# Patient Record
Sex: MALE | Race: WHITE | NOT HISPANIC OR LATINO | URBAN - METROPOLITAN AREA
[De-identification: names, ages, dates, MRNs, and addresses within clinical notes are randomized per-mention and may not be internally consistent; named-entity substitution may affect disease eponyms.]

---

## 2020-05-26 ENCOUNTER — INPATIENT (INPATIENT)
Facility: HOSPITAL | Age: 85
LOS: 2 days | Discharge: HOME HEALTH | End: 2020-05-29
Attending: HOSPITALIST | Admitting: HOSPITALIST
Payer: MEDICARE

## 2020-05-26 VITALS
OXYGEN SATURATION: 99 % | RESPIRATION RATE: 16 BRPM | SYSTOLIC BLOOD PRESSURE: 214 MMHG | TEMPERATURE: 97 F | DIASTOLIC BLOOD PRESSURE: 92 MMHG | HEART RATE: 76 BPM

## 2020-05-26 LAB
ALBUMIN SERPL ELPH-MCNC: 3.9 G/DL — SIGNIFICANT CHANGE UP (ref 3.5–5.2)
ALP SERPL-CCNC: 232 U/L — HIGH (ref 30–115)
ALT FLD-CCNC: 11 U/L — SIGNIFICANT CHANGE UP (ref 0–41)
ANION GAP SERPL CALC-SCNC: 10 MMOL/L — SIGNIFICANT CHANGE UP (ref 7–14)
APPEARANCE UR: CLEAR — SIGNIFICANT CHANGE UP
APTT BLD: 28.5 SEC — SIGNIFICANT CHANGE UP (ref 27–39.2)
AST SERPL-CCNC: 15 U/L — SIGNIFICANT CHANGE UP (ref 0–41)
BACTERIA # UR AUTO: ABNORMAL
BASOPHILS # BLD AUTO: 0.03 K/UL — SIGNIFICANT CHANGE UP (ref 0–0.2)
BASOPHILS NFR BLD AUTO: 0.4 % — SIGNIFICANT CHANGE UP (ref 0–1)
BILIRUB SERPL-MCNC: 0.3 MG/DL — SIGNIFICANT CHANGE UP (ref 0.2–1.2)
BILIRUB UR-MCNC: NEGATIVE — SIGNIFICANT CHANGE UP
BUN SERPL-MCNC: 59 MG/DL — HIGH (ref 10–20)
CALCIUM SERPL-MCNC: 8.7 MG/DL — SIGNIFICANT CHANGE UP (ref 8.5–10.1)
CHLORIDE SERPL-SCNC: 108 MMOL/L — SIGNIFICANT CHANGE UP (ref 98–110)
CO2 SERPL-SCNC: 22 MMOL/L — SIGNIFICANT CHANGE UP (ref 17–32)
COD CRY URNS QL: NEGATIVE — SIGNIFICANT CHANGE UP
COLOR SPEC: YELLOW — SIGNIFICANT CHANGE UP
CREAT SERPL-MCNC: 2.7 MG/DL — HIGH (ref 0.7–1.5)
DIFF PNL FLD: ABNORMAL
EOSINOPHIL # BLD AUTO: 0.18 K/UL — SIGNIFICANT CHANGE UP (ref 0–0.7)
EOSINOPHIL NFR BLD AUTO: 2.5 % — SIGNIFICANT CHANGE UP (ref 0–8)
EPI CELLS # UR: ABNORMAL /HPF
ETHANOL SERPL-MCNC: <10 MG/DL — SIGNIFICANT CHANGE UP
GLUCOSE SERPL-MCNC: 90 MG/DL — SIGNIFICANT CHANGE UP (ref 70–99)
GLUCOSE UR QL: NEGATIVE MG/DL — SIGNIFICANT CHANGE UP
GRAN CASTS # UR COMP ASSIST: NEGATIVE — SIGNIFICANT CHANGE UP
HCT VFR BLD CALC: 35.3 % — LOW (ref 42–52)
HGB BLD-MCNC: 11.8 G/DL — LOW (ref 14–18)
HYALINE CASTS # UR AUTO: NEGATIVE — SIGNIFICANT CHANGE UP
IMM GRANULOCYTES NFR BLD AUTO: 0.4 % — HIGH (ref 0.1–0.3)
INR BLD: 0.96 RATIO — SIGNIFICANT CHANGE UP (ref 0.65–1.3)
KETONES UR-MCNC: NEGATIVE — SIGNIFICANT CHANGE UP
LACTATE SERPL-SCNC: 1 MMOL/L — SIGNIFICANT CHANGE UP (ref 0.7–2)
LEUKOCYTE ESTERASE UR-ACNC: ABNORMAL
LIDOCAIN IGE QN: 74 U/L — HIGH (ref 7–60)
LYMPHOCYTES # BLD AUTO: 1.24 K/UL — SIGNIFICANT CHANGE UP (ref 1.2–3.4)
LYMPHOCYTES # BLD AUTO: 17.1 % — LOW (ref 20.5–51.1)
MAGNESIUM SERPL-MCNC: 2.2 MG/DL — SIGNIFICANT CHANGE UP (ref 1.8–2.4)
MCHC RBC-ENTMCNC: 30 PG — SIGNIFICANT CHANGE UP (ref 27–31)
MCHC RBC-ENTMCNC: 33.4 G/DL — SIGNIFICANT CHANGE UP (ref 32–37)
MCV RBC AUTO: 89.8 FL — SIGNIFICANT CHANGE UP (ref 80–94)
MONOCYTES # BLD AUTO: 0.7 K/UL — HIGH (ref 0.1–0.6)
MONOCYTES NFR BLD AUTO: 9.6 % — HIGH (ref 1.7–9.3)
NEUTROPHILS # BLD AUTO: 5.08 K/UL — SIGNIFICANT CHANGE UP (ref 1.4–6.5)
NEUTROPHILS NFR BLD AUTO: 70 % — SIGNIFICANT CHANGE UP (ref 42.2–75.2)
NITRITE UR-MCNC: NEGATIVE — SIGNIFICANT CHANGE UP
NRBC # BLD: 0 /100 WBCS — SIGNIFICANT CHANGE UP (ref 0–0)
PH UR: 6 — SIGNIFICANT CHANGE UP (ref 5–8)
PLATELET # BLD AUTO: 182 K/UL — SIGNIFICANT CHANGE UP (ref 130–400)
POTASSIUM SERPL-MCNC: 5 MMOL/L — SIGNIFICANT CHANGE UP (ref 3.5–5)
POTASSIUM SERPL-SCNC: 5 MMOL/L — SIGNIFICANT CHANGE UP (ref 3.5–5)
PROT SERPL-MCNC: 6.3 G/DL — SIGNIFICANT CHANGE UP (ref 6–8)
PROT UR-MCNC: 100 MG/DL
PROTHROM AB SERPL-ACNC: 11 SEC — SIGNIFICANT CHANGE UP (ref 9.95–12.87)
RBC # BLD: 3.93 M/UL — LOW (ref 4.7–6.1)
RBC # FLD: 14.5 % — SIGNIFICANT CHANGE UP (ref 11.5–14.5)
RBC CASTS # UR COMP ASSIST: ABNORMAL /HPF
SODIUM SERPL-SCNC: 140 MMOL/L — SIGNIFICANT CHANGE UP (ref 135–146)
SP GR SPEC: 1.01 — SIGNIFICANT CHANGE UP (ref 1.01–1.03)
TRI-PHOS CRY UR QL COMP ASSIST: NEGATIVE — SIGNIFICANT CHANGE UP
TROPONIN T SERPL-MCNC: 0.05 NG/ML — CRITICAL HIGH
TROPONIN T SERPL-MCNC: 0.05 NG/ML — CRITICAL HIGH
URATE CRY FLD QL MICRO: NEGATIVE — SIGNIFICANT CHANGE UP
UROBILINOGEN FLD QL: 0.2 MG/DL — SIGNIFICANT CHANGE UP (ref 0.2–0.2)
WBC # BLD: 7.26 K/UL — SIGNIFICANT CHANGE UP (ref 4.8–10.8)
WBC # FLD AUTO: 7.26 K/UL — SIGNIFICANT CHANGE UP (ref 4.8–10.8)
WBC UR QL: ABNORMAL /HPF

## 2020-05-26 PROCEDURE — 70450 CT HEAD/BRAIN W/O DYE: CPT | Mod: 26

## 2020-05-26 PROCEDURE — 99223 1ST HOSP IP/OBS HIGH 75: CPT

## 2020-05-26 PROCEDURE — 71045 X-RAY EXAM CHEST 1 VIEW: CPT | Mod: 26

## 2020-05-26 PROCEDURE — 72170 X-RAY EXAM OF PELVIS: CPT | Mod: 26

## 2020-05-26 PROCEDURE — 72125 CT NECK SPINE W/O DYE: CPT | Mod: 26

## 2020-05-26 PROCEDURE — 99285 EMERGENCY DEPT VISIT HI MDM: CPT

## 2020-05-26 PROCEDURE — 76770 US EXAM ABDO BACK WALL COMP: CPT | Mod: 26

## 2020-05-26 RX ORDER — SODIUM CHLORIDE 9 MG/ML
1000 INJECTION INTRAMUSCULAR; INTRAVENOUS; SUBCUTANEOUS ONCE
Refills: 0 | Status: COMPLETED | OUTPATIENT
Start: 2020-05-26 | End: 2020-05-26

## 2020-05-26 RX ORDER — CEFTRIAXONE 500 MG/1
1000 INJECTION, POWDER, FOR SOLUTION INTRAMUSCULAR; INTRAVENOUS EVERY 24 HOURS
Refills: 0 | Status: DISCONTINUED | OUTPATIENT
Start: 2020-05-26 | End: 2020-05-29

## 2020-05-26 RX ORDER — ENOXAPARIN SODIUM 100 MG/ML
40 INJECTION SUBCUTANEOUS DAILY
Refills: 0 | Status: DISCONTINUED | OUTPATIENT
Start: 2020-05-26 | End: 2020-05-27

## 2020-05-26 RX ORDER — SODIUM CHLORIDE 9 MG/ML
1000 INJECTION INTRAMUSCULAR; INTRAVENOUS; SUBCUTANEOUS
Refills: 0 | Status: DISCONTINUED | OUTPATIENT
Start: 2020-05-26 | End: 2020-05-28

## 2020-05-26 RX ORDER — HALOPERIDOL DECANOATE 100 MG/ML
0.5 INJECTION INTRAMUSCULAR ONCE
Refills: 0 | Status: COMPLETED | OUTPATIENT
Start: 2020-05-26 | End: 2020-05-26

## 2020-05-26 RX ADMIN — HALOPERIDOL DECANOATE 0.5 MILLIGRAM(S): 100 INJECTION INTRAMUSCULAR at 23:52

## 2020-05-26 RX ADMIN — SODIUM CHLORIDE 2000 MILLILITER(S): 9 INJECTION INTRAMUSCULAR; INTRAVENOUS; SUBCUTANEOUS at 18:41

## 2020-05-26 NOTE — ED ADULT TRIAGE NOTE - CHIEF COMPLAINT QUOTE
BIBA for complain of Altered mental status. Pt. was with niece went to [employer]. While niece was inside the store patient got out of the car and fell on his knees as witnessed by bystanders, No LOC.

## 2020-05-26 NOTE — ED PROVIDER NOTE - OBJECTIVE STATEMENT
89 y/o male with PMH of dementia who presents to ED for AMS. Per niece, she took pt to [employer] today and while she was in the store he was supposed t be waiting in the car, and pt wandered and was found in the parking lot ?fall and ambulance was called. Pt has no complaints. Per niece, pt lives with his broher (her father) and hse visits daily. No home health aid.

## 2020-05-26 NOTE — H&P ADULT - HISTORY OF PRESENT ILLNESS
Pt is a 89 YOM with a pmh of dementia presented to the ER with a chief complaitn of fall. History obtained from chart as pt is a poor historian. Pt was apparently found in the parking lot of [employer] after a fall where he was supposed to be waiting in the car while his niece was in the store shopping. In the ER, pt was found to have MADISON with a BUN/Cr of 59/2.7, elevated troponin of .05 with no acute changes on his EKG, pt denied chest pain. CT head was negative for any acute changes. Pt was seen and examined at bedside, pt denies any CP, SOB, palpitations. He is alert although confused and does not know why he is in the hospital.

## 2020-05-26 NOTE — ED PROVIDER NOTE - PHYSICAL EXAMINATION
CONSTITUTIONAL: Unkempt disheveled, covered in old feces.   SKIN: warm, dry  HEAD: Normocephalic; atraumatic.  EYES: no conjunctival injection. PERRL.   ENT: No nasal discharge; airway clear.  NECK: Supple; non tender.  CARD: S1, S2 normal; no murmurs, gallops, or rubs. Regular rate and rhythm.   RESP: No wheezes, rales or rhonchi.  ABD: soft ntnd  EXT: Normal ROM.  No clubbing, cyanosis or edema.   LYMPH: No acute cervical adenopathy.  NEURO: Alert, oriented to person only.   PSYCH: Cooperative, appropriate.

## 2020-05-26 NOTE — H&P ADULT - NSHPPHYSICALEXAM_GEN_ALL_CORE
PHYSICAL EXAM:    CONSTITUTIONAL: NAD, saturating at >90% on RA.   ENMT: EOMI, PERRLA, No tonsillar erythema, exudates, or enlargement, neck supple, No JVD  RESPIRATORY: Clear to percussion bilaterally; No rales, rhonchi, wheezing, or rubs  CARDIOVASCULAR: Regular rate and rhythm; No murmurs, rubs, or gallops, negative edema  GASTROINTESTINAL: Soft, Nontender, Nondistended; Bowel sounds present  EXTREMITIES:  2+ Peripheral Pulses, No clubbing, cyanosis  PSYCH: Alert & Oriented X1 to person not to place or time, alert although confused.   NEURO: A&O X1, follows commands.   SKIN: No rashes or lesions

## 2020-05-26 NOTE — ED PROVIDER NOTE - ATTENDING CONTRIBUTION TO CARE
89yo M history of dementia BIBEMS presenting sp fall. Per family/EMS, he was at [employer] but wandered off, fell. Unknown mechanism of fall. Pt unsure. Pt currently with no complaints. Per family, pt lives with his brother who cares for him.   Constitutional: disheveled, caked in feces  Eyes: PERRLA. Extraocular movements intact, no entrapment. Conjunctiva normal.   ENT: No nasal discharge. Moist mucus membranes.  Neck: Supple, non tender, full range of motion. no midline CTL spine ttp throughout  CV: RRR no murmurs, rubs, or gallops. +S1S2.   Pulm: Clear to auscultation bilaterally. Normal work of breathing.  Abd: soft NT ND +BS.   Pelvis stable  Ext: Warm and well perfused x4, moving all extremities, no edema.   Psy: Cooperative, appropriate.   Skin: Warm, dry, no rash  Neuro: CN2-12 grossly intact no sensory or motor deficits throughout, no drift

## 2020-05-26 NOTE — ED ADULT NURSE NOTE - INTERVENTIONS DEFINITIONS
Non-slip footwear when patient is off stretcher/Physically safe environment: no spills, clutter or unnecessary equipment/Provide visual cue, wrist band, yellow gown, etc./Monitor gait and stability/Stretcher in lowest position, wheels locked, appropriate side rails in place

## 2020-05-26 NOTE — H&P ADULT - ASSESSMENT
Pt is a 89 YO M who will be admitted for the workup of altered mental status, possibly secondary to metabolic encephalopathy due to UTI with baseline dementia. Pt has a UTI, start ceftriaxone and f/u urine culture. Check B12, TSH, RPR, neurology consult pending. For his mildly elevated troponin, trend troponin, no ekg changes, asymptomatic, cardiology consult pending. For his MADISON, possibly secondary to dehydration, will provide light hydration, trend renal function, nephrology consult pending.     DVT ppx: lvnx  Fall risk  Diet: regular Pt is a 89 YO M who will be admitted for the workup of altered mental status, possibly secondary to metabolic encephalopathy due to UTI with baseline dementia. Pt has a UTI, start ceftriaxone and f/u urine culture. Check B12, TSH, RPR, neurology consult pending. For his mildly elevated troponin, trend troponin, no ekg changes, asymptomatic, cardiology consult pending. For his MADISON, possibly secondary to dehydration, will provide light hydration, trend renal function, nephrology consult pending.     DVT ppx: heparin SC  Fall risk  Diet: regular Pt is a 89 YO M who will be admitted for the workup of altered mental status, possibly secondary to metabolic encephalopathy due to UTI with baseline dementia. Pt has a UTI, start ceftriaxone and f/u urine culture. Check B12, TSH, RPR, neurology consult pending. For his mildly elevated troponin, trend troponin, no ekg changes, asymptomatic, cardiology consult pending. For his MADISON, possibly secondary to dehydration, will provide light hydration, trend renal function, nephrology consult pending.     Update home meds in the AM, pt does not know them due to mental status.     DVT ppx: heparin SC  Fall risk  Diet: regular

## 2020-05-26 NOTE — H&P ADULT - NSHPLABSRESULTS_GEN_ALL_CORE
11.8   .  )-----------( 182      ( 26 May 2020 18:21 )             35.3           140  |  108  |  59<H>  ----------------------------<  90  5.0   |  22  |  2.7<H>    Ca    8.7      26 May 2020 18:21  Mg     2.2         TPro  6.3  /  Alb  3.9  /  TBili  0.3  /  DBili  x   /  AST  15  /  ALT  11  /  AlkPhos  232<H>                Urinalysis Basic - ( 26 May 2020 18:21 )    Color: Yellow / Appearance: Clear / S.015 / pH: x  Gluc: x / Ketone: Negative  / Bili: Negative / Urobili: 0.2 mg/dL   Blood: x / Protein: 100 mg/dL / Nitrite: Negative   Leuk Esterase: Large / RBC: 3-5 /HPF / WBC 10-25 /HPF   Sq Epi: x / Non Sq Epi: Few /HPF / Bacteria: Few        PT/INR - ( 26 May 2020 18:21 )   PT: 11.00 sec;   INR: 0.96 ratio         PTT - ( 26 May 2020 18:21 )  PTT:28.5 sec    Lactate Trend   @ 18:21 Lactate:1.0       CARDIAC MARKERS ( 26 May 2020 18:21 )  x     / 0.05 ng/mL / x     / x     / x            CAPILLARY BLOOD GLUCOSE      POCT Blood Glucose.: 89 mg/dL (26 May 2020 18:11)

## 2020-05-27 LAB
ALBUMIN SERPL ELPH-MCNC: 3.7 G/DL — SIGNIFICANT CHANGE UP (ref 3.5–5.2)
ALP SERPL-CCNC: 252 U/L — HIGH (ref 30–115)
ALT FLD-CCNC: 12 U/L — SIGNIFICANT CHANGE UP (ref 0–41)
ANION GAP SERPL CALC-SCNC: 12 MMOL/L — SIGNIFICANT CHANGE UP (ref 7–14)
AST SERPL-CCNC: 21 U/L — SIGNIFICANT CHANGE UP (ref 0–41)
BILIRUB SERPL-MCNC: 0.5 MG/DL — SIGNIFICANT CHANGE UP (ref 0.2–1.2)
BUN SERPL-MCNC: 54 MG/DL — HIGH (ref 10–20)
CALCIUM SERPL-MCNC: 9 MG/DL — SIGNIFICANT CHANGE UP (ref 8.5–10.1)
CHLORIDE SERPL-SCNC: 111 MMOL/L — HIGH (ref 98–110)
CK MB CFR SERPL CALC: 12.8 NG/ML — HIGH (ref 0.6–6.3)
CK SERPL-CCNC: 712 U/L — HIGH (ref 0–225)
CO2 SERPL-SCNC: 21 MMOL/L — SIGNIFICANT CHANGE UP (ref 17–32)
CREAT SERPL-MCNC: 2.5 MG/DL — HIGH (ref 0.7–1.5)
GLUCOSE SERPL-MCNC: 90 MG/DL — SIGNIFICANT CHANGE UP (ref 70–99)
HCT VFR BLD CALC: 37.1 % — LOW (ref 42–52)
HGB BLD-MCNC: 11.9 G/DL — LOW (ref 14–18)
MCHC RBC-ENTMCNC: 29.2 PG — SIGNIFICANT CHANGE UP (ref 27–31)
MCHC RBC-ENTMCNC: 32.1 G/DL — SIGNIFICANT CHANGE UP (ref 32–37)
MCV RBC AUTO: 91.2 FL — SIGNIFICANT CHANGE UP (ref 80–94)
NRBC # BLD: 0 /100 WBCS — SIGNIFICANT CHANGE UP (ref 0–0)
PLATELET # BLD AUTO: 197 K/UL — SIGNIFICANT CHANGE UP (ref 130–400)
POTASSIUM SERPL-MCNC: 4.5 MMOL/L — SIGNIFICANT CHANGE UP (ref 3.5–5)
POTASSIUM SERPL-SCNC: 4.5 MMOL/L — SIGNIFICANT CHANGE UP (ref 3.5–5)
PROT SERPL-MCNC: 6 G/DL — SIGNIFICANT CHANGE UP (ref 6–8)
RBC # BLD: 4.07 M/UL — LOW (ref 4.7–6.1)
RBC # FLD: 14.3 % — SIGNIFICANT CHANGE UP (ref 11.5–14.5)
SARS-COV-2 RNA SPEC QL NAA+PROBE: SIGNIFICANT CHANGE UP
SODIUM SERPL-SCNC: 144 MMOL/L — SIGNIFICANT CHANGE UP (ref 135–146)
T PALLIDUM AB TITR SER: NEGATIVE — SIGNIFICANT CHANGE UP
TROPONIN T SERPL-MCNC: 0.07 NG/ML — CRITICAL HIGH
TROPONIN T SERPL-MCNC: 0.07 NG/ML — CRITICAL HIGH
TSH SERPL-MCNC: 6.39 UIU/ML — HIGH (ref 0.27–4.2)
VIT B12 SERPL-MCNC: 821 PG/ML — SIGNIFICANT CHANGE UP (ref 232–1245)
WBC # BLD: 6.83 K/UL — SIGNIFICANT CHANGE UP (ref 4.8–10.8)
WBC # FLD AUTO: 6.83 K/UL — SIGNIFICANT CHANGE UP (ref 4.8–10.8)

## 2020-05-27 PROCEDURE — 99233 SBSQ HOSP IP/OBS HIGH 50: CPT

## 2020-05-27 PROCEDURE — 93970 EXTREMITY STUDY: CPT | Mod: 26

## 2020-05-27 PROCEDURE — 99222 1ST HOSP IP/OBS MODERATE 55: CPT

## 2020-05-27 RX ORDER — HALOPERIDOL DECANOATE 100 MG/ML
1 INJECTION INTRAMUSCULAR ONCE
Refills: 0 | Status: COMPLETED | OUTPATIENT
Start: 2020-05-27 | End: 2020-05-27

## 2020-05-27 RX ORDER — HALOPERIDOL DECANOATE 100 MG/ML
1 INJECTION INTRAMUSCULAR EVERY 6 HOURS
Refills: 0 | Status: DISCONTINUED | OUTPATIENT
Start: 2020-05-27 | End: 2020-05-29

## 2020-05-27 RX ORDER — HEPARIN SODIUM 5000 [USP'U]/ML
5000 INJECTION INTRAVENOUS; SUBCUTANEOUS EVERY 8 HOURS
Refills: 0 | Status: DISCONTINUED | OUTPATIENT
Start: 2020-05-27 | End: 2020-05-29

## 2020-05-27 RX ADMIN — HEPARIN SODIUM 5000 UNIT(S): 5000 INJECTION INTRAVENOUS; SUBCUTANEOUS at 14:20

## 2020-05-27 RX ADMIN — HALOPERIDOL DECANOATE 1 MILLIGRAM(S): 100 INJECTION INTRAMUSCULAR at 22:03

## 2020-05-27 RX ADMIN — HEPARIN SODIUM 5000 UNIT(S): 5000 INJECTION INTRAVENOUS; SUBCUTANEOUS at 05:27

## 2020-05-27 RX ADMIN — Medication 0.1 MILLIGRAM(S): at 06:41

## 2020-05-27 RX ADMIN — HALOPERIDOL DECANOATE 1 MILLIGRAM(S): 100 INJECTION INTRAMUSCULAR at 00:15

## 2020-05-27 RX ADMIN — HALOPERIDOL DECANOATE 1 MILLIGRAM(S): 100 INJECTION INTRAMUSCULAR at 07:07

## 2020-05-27 RX ADMIN — HALOPERIDOL DECANOATE 1 MILLIGRAM(S): 100 INJECTION INTRAMUSCULAR at 16:54

## 2020-05-27 RX ADMIN — SODIUM CHLORIDE 75 MILLILITER(S): 9 INJECTION INTRAMUSCULAR; INTRAVENOUS; SUBCUTANEOUS at 01:15

## 2020-05-27 RX ADMIN — CEFTRIAXONE 100 MILLIGRAM(S): 500 INJECTION, POWDER, FOR SOLUTION INTRAMUSCULAR; INTRAVENOUS at 01:14

## 2020-05-27 RX ADMIN — HALOPERIDOL DECANOATE 1 MILLIGRAM(S): 100 INJECTION INTRAMUSCULAR at 14:31

## 2020-05-27 NOTE — PROGRESS NOTE ADULT - SUBJECTIVE AND OBJECTIVE BOX
[place]  89y  Male      Patient is a 89y old Male who presents with a chief complaint of elevated troponin (27 May 2020 09:06)      INTERVAL HPI/OVERNIGHT EVENTS:  Patient seen and examined earlier this morning.  Lying in bed.  Confused.      REVIEW OF SYSTEMS:  unable to determine due to mental status     T(C): 36.2 (20 @ 20:38), Max: 36.2 (20 @ 20:38)  HR: 57 (20 @ 09:41) (57 - 87)  BP: 150/70 (20 @ 09:41) (150/70 - 214/92)  RR: 18 (20 @ 06:35) (16 - 18)  SpO2: 98% (20 @ 06:35) (97% - 99%)    PHYSICAL EXAM:  GENERAL: NAD, well-groomed, well-developed  HEAD:  Atraumatic, Normocephalic  EYES: EOMI, PERRLA, conjunctiva and sclera clear  ENMT: No tonsillar erythema, exudates, or enlargement; Moist mucous membranes, Good dentition, No lesions  NECK: Supple, No JVD, Normal thyroid  NERVOUS SYSTEM:  awake, confused, moves all extremities  CHEST/LUNG: Clear to percussion bilaterally; No rales, rhonchi, wheezing, or rubs  HEART: Regular rate and rhythm; No murmurs, rubs, or gallops  ABDOMEN: Soft, Nontender, Nondistended; Bowel sounds present  EXTREMITIES:  2+ Peripheral Pulses, No clubbing, cyanosis.  LLE edema +  LYMPH: No lymphadenopathy noted  SKIN: multiple ecchymotic areas    Consultant(s) Notes Reviewed:  [x ] YES  [ ] NO  Care Discussed with Consultants/Other Providers [ x] YES  [ ] NO    LAB:                        11.9   6.83  )-----------( 197      ( 27 May 2020 06:38 )             37.1         144  |  111<H>  |  54<H>  ----------------------------<  90  4.5   |  21  |  2.5<H>    Ca    9.0      27 May 2020 06:38  Mg     2.2         TPro  6.0  /  Alb  3.7  /  TBili  0.5  /  DBili  x   /  AST  21  /  ALT  12  /  AlkPhos  252<H>  05-27    LIVER FUNCTIONS - ( 27 May 2020 06:38 )  Alb: 3.7 g/dL / Pro: 6.0 g/dL / ALK PHOS: 252 U/L / ALT: 12 U/L / AST: 21 U/L / GGT: x           CARDIAC MARKERS ( 27 May 2020 12:21 )  x     / 0.07 ng/mL / 712 U/L / x     / 12.8 ng/mL  CARDIAC MARKERS ( 27 May 2020 06:38 )  x     / 0.07 ng/mL / x     / x     / x      CARDIAC MARKERS ( 26 May 2020 22:00 )  x     / 0.05 ng/mL / x     / x     / x      CARDIAC MARKERS ( 26 May 2020 18:21 )  x     / 0.05 ng/mL / x     / x     / x            CAPILLARY BLOOD GLUCOSE  POCT Blood Glucose.: 89 mg/dL (26 May 2020 18:11)    I&O's Summary  27 May 2020 07:01  -  27 May 2020 13:20  --------------------------------------------------------  IN: 0 mL / OUT: 150 mL / NET: -150 mL      Urinalysis Basic - ( 26 May 2020 18:21 )    Color: Yellow / Appearance: Clear / S.015 / pH: x  Gluc: x / Ketone: Negative  / Bili: Negative / Urobili: 0.2 mg/dL   Blood: x / Protein: 100 mg/dL / Nitrite: Negative   Leuk Esterase: Large / RBC: 3-5 /HPF / WBC 10-25 /HPF   Sq Epi: x / Non Sq Epi: Few /HPF / Bacteria: Few        RADIOLOGY & ADDITIONAL TESTS:  Imaging Personally Reviewed:  [x] YES  [ ] NO        MEDS:  cefTRIAXone   IVPB 1000 milliGRAM(s) IV Intermittent every 24 hours  heparin SubCutaneous Injection - Peds 5000 Unit(s) SubCutaneous every 8 hours  sodium chloride 0.9%. 1000 milliLiter(s) IV Continuous <Continuous>

## 2020-05-27 NOTE — CONSULT NOTE ADULT - ASSESSMENT
Patient was agitated. Now sedated. No  chest pain sob. Hx as above. He has MADISON. Troponin mildly elevated. No evidence MI. May be secondary MADISON. He needs repeat enzymes ekg echo ortho bp.

## 2020-05-27 NOTE — CONSULT NOTE ADULT - SUBJECTIVE AND OBJECTIVE BOX
Neurology Consultation note    Name  SANTO GORGE    HPI:  Pt is a 89 YOM with a pmh of dementia presented to the ER with a chief complaitn of fall. History obtained from chart as pt is a poor historian. Pt was apparently found in the parking lot of [employer] after a fall where he was supposed to be waiting in the car while his niece was in the store shopping. In the ER, pt was found to have MADISON with a BUN/Cr of 59/2.7, elevated troponin of .05 with no acute changes on his EKG, pt denied chest pain. CT head was negative for any acute changes. Pt was seen and examined at bedside, pt denies any CP, SOB, palpitations. He is alert although confused and does not know why he is in the hospital. (26 May 2020 21:13)      Interval History:        Vital Signs Last 24 Hrs  T(C): 36.2 (26 May 2020 20:38), Max: 36.2 (26 May 2020 20:38)  T(F): 97.1 (26 May 2020 20:38), Max: 97.1 (26 May 2020 20:38)  HR: 84 (27 May 2020 06:35) (76 - 87)  BP: 208/96 (27 May 2020 06:35) (171/79 - 214/92)  BP(mean): --  RR: 18 (27 May 2020 06:35) (16 - 18)  SpO2: 98% (27 May 2020 06:35) (97% - 99%)    Neurological Exam:   Mental status: Awake, alert and oriented x3.  Recent and remote memory intact.  Naming, repetition and comprehension intact.  Attention/concentration intact.  No dysarthria, no aphasia.  Fund of knowledge appropriate.    Cranial nerves: Pupils equally round and reactive to light, visual fields full, no nystagmus, extraocular muscles intact, V1 through V3 intact bilaterally and symmetric, face symmetric, hearing intact to finger rub, palate elevation symmetric, tongue was midline.  Motor:  MRC grading 5/5 b/l UE/LE.   strength 5/5.  Normal tone and bulk.  No abnormal movements.    Sensation: Intact to light touch, proprioception, and pinprick.   Coordination: No dysmetria on finger-to-nose and heel-to-shin.  No dysdiadokinesia.  Reflexes: 2+ in bilateral UE/LE, downgoing toes bilaterally. (-) [place].  Gait: Narrow and steady. No ataxia.  Romberg negative    Medications  cefTRIAXone   IVPB 1000 milliGRAM(s) IV Intermittent every 24 hours  heparin SubCutaneous Injection - Peds 5000 Unit(s) SubCutaneous every 8 hours  sodium chloride 0.9%. 1000 milliLiter(s) IV Continuous <Continuous>      Lab  05-26    140  |  108  |  59<H>  ----------------------------<  90  5.0   |  22  |  2.7<H>    Ca    8.7      26 May 2020 18:21  Mg     2.2     05-26    TPro  6.3  /  Alb  3.9  /  TBili  0.3  /  DBili  x   /  AST  15  /  ALT  11  /  AlkPhos  232<H>  05-26                          11.8   7.26  )-----------( 182      ( 26 May 2020 18:21 )             35.3     LIVER FUNCTIONS - ( 26 May 2020 18:21 )  Alb: 3.9 g/dL / Pro: 6.3 g/dL / ALK PHOS: 232 U/L / ALT: 11 U/L / AST: 15 U/L / GGT: x             2.2        Radiology  CT Head No Cont:   EXAM:  CT BRAIN            PROCEDURE DATE:  05/26/2020            INTERPRETATION:  CLINICAL HISTORY/REASON FOR EXAM: Trauma    Technique: Noncontrast head CT.  Contiguous unenhanced CT axial images of the head from the base to the vertex with coronal and sagittal reformats.    Comparison: None    Findings:    There is prominence of the ventricles, cortical sulci, and basal cisterns consistent with parenchymal volume loss compatible with the patient's age.    Gray-white differentiation is maintained.    Patchy areas of hypodensity in the periventricular and subcortical white matter, consistent with chronic microvascular ischemic disease.     No evidence of acute intracranial hemorrhage, territorial infarct, or significant space-occupying lesion.     Calcifications are noted in the region of the carotid siphons    Beam hardening artifact is noted overlying the brain stem and posterior fossa which is inherent to CT in this location. Aside from mild atrophy, the posterior fossa is otherwiseunremarkable.    No depressed calvarial fracture.    There is mucosal thickening within the sphenoid sinuses compatible with sinusitis, otherwise the visualized portions of the paranasal sinuses and mastoids are unremarkable.         IMPRESSION:     Parenchymal volume loss compatible with the patient's age  No CT evidence of acute fracture, intracranial hemorrhage, midline shift, or mass effect.          Assessment:  89 yo man with baseline dementia adm s/p fall and ams  pt found to be in MADISON and also with elevated troponins  likely TME    Plan:  start w/u with REEG (ordered)  full consult to follow Neurology Consultation note    Name  SANTO GORGE    HPI:  Pt is a 89 YOM with a pmh of dementia presented to the ER with a chief complaitn of fall. History obtained from chart as pt is a poor historian. Pt was apparently found in the parking lot of [employer] after a fall where he was supposed to be waiting in the car while his niece was in the store shopping. In the ER, pt was found to have MADISON with a BUN/Cr of 59/2.7, elevated troponin of .05 with no acute changes on his EKG, pt denied chest pain. CT head was negative for any acute changes. Pt was seen and examined at bedside, pt denies any CP, SOB, palpitations. He is alert although confused and does not know why he is in the hospital. (26 May 2020 21:13)      NEURO:  HX AS PER HIS NIECE WHO I SPOKE WITH THIS AM:  patient is a 89 yo man with baseline dementia x 1 yr who was adm b/c of fall while she was in the store. he wandered from the car and fell.  he was not exhibiting a change in mental status at the time.  there was no LOC. He was at his normal baseline asking to go home.  she notes that it was presumably a mechanical fall because recently he has had ankle swelling and leg pain.  he is in MADISON and has elevated troponin. cxr w/ bilateral opacities. COVID is pending.  CTH no acute change        Vital Signs Last 24 Hrs  T(C): 36.2 (26 May 2020 20:38), Max: 36.2 (26 May 2020 20:38)  T(F): 97.1 (26 May 2020 20:38), Max: 97.1 (26 May 2020 20:38)  HR: 84 (27 May 2020 06:35) (76 - 87)  BP: 208/96 (27 May 2020 06:35) (171/79 - 214/92)  BP(mean): --  RR: 18 (27 May 2020 06:35) (16 - 18)  SpO2: 98% (27 May 2020 06:35) (97% - 99%)    Neurological Exam:   Mental status: Awake, alert.   Cranial nerves: Pupils equally round and reactive to light, visual fields full, no nystagmus, extraocular muscles intact, V1 through V3 intact bilaterally and symmetric, face symmetric, hearing intact to finger rub, palate elevation symmetric, tongue was midline.  Motor:  MRC grading 5/5 b/l UE/LE.   strength 5/5.  Normal tone and bulk.  No abnormal movements.    Sensation: Intact to light touch, proprioception, and pinprick.   Coordination: No dysmetria on finger-to-nose and heel-to-shin.  No dysdiadokinesia.        Medications  cefTRIAXone   IVPB 1000 milliGRAM(s) IV Intermittent every 24 hours  heparin SubCutaneous Injection - Peds 5000 Unit(s) SubCutaneous every 8 hours  sodium chloride 0.9%. 1000 milliLiter(s) IV Continuous <Continuous>      Lab  05-26    140  |  108  |  59<H>  ----------------------------<  90  5.0   |  22  |  2.7<H>    Ca    8.7      26 May 2020 18:21  Mg     2.2     05-26    TPro  6.3  /  Alb  3.9  /  TBili  0.3  /  DBili  x   /  AST  15  /  ALT  11  /  AlkPhos  232<H>  05-26                          11.8   7.26  )-----------( 182      ( 26 May 2020 18:21 )             35.3     LIVER FUNCTIONS - ( 26 May 2020 18:21 )  Alb: 3.9 g/dL / Pro: 6.3 g/dL / ALK PHOS: 232 U/L / ALT: 11 U/L / AST: 15 U/L / GGT: x             2.2        Radiology  CT Head No Cont:   EXAM:  CT BRAIN            PROCEDURE DATE:  05/26/2020            INTERPRETATION:  CLINICAL HISTORY/REASON FOR EXAM: Trauma    Technique: Noncontrast head CT.  Contiguous unenhanced CT axial images of the head from the base to the vertex with coronal and sagittal reformats.    Comparison: None    Findings:    There is prominence of the ventricles, cortical sulci, and basal cisterns consistent with parenchymal volume loss compatible with the patient's age.    Gray-white differentiation is maintained.    Patchy areas of hypodensity in the periventricular and subcortical white matter, consistent with chronic microvascular ischemic disease.     No evidence of acute intracranial hemorrhage, territorial infarct, or significant space-occupying lesion.     Calcifications are noted in the region of the carotid siphons    Beam hardening artifact is noted overlying the brain stem and posterior fossa which is inherent to CT in this location. Aside from mild atrophy, the posterior fossa is otherwiseunremarkable.    No depressed calvarial fracture.    There is mucosal thickening within the sphenoid sinuses compatible with sinusitis, otherwise the visualized portions of the paranasal sinuses and mastoids are unremarkable.         IMPRESSION:     Parenchymal volume loss compatible with the patient's age  No CT evidence of acute fracture, intracranial hemorrhage, midline shift, or mass effect.          Assessment:  89 yo man with baseline dementia adm s/p fall and ams. fall likely mechanical as per niece as he has been c/o leg pain and swelling. there was no LOC  pt found to be in MADISON and also with elevated troponins  likely TME 2/2 MADISON superimposed on underlying dementia    Plan:  start w/u with REEG (ordered)  check b12, tsh and folate  eval/tx  as outpt for dementia  full consult to follow Neurology Consultation note    Name  SANTO GORGE    HPI:  Pt is a 89 YOM with a pmh of dementia presented to the ER with a chief complaitn of fall. History obtained from chart as pt is a poor historian. Pt was apparently found in the parking lot of [employer] after a fall where he was supposed to be waiting in the car while his niece was in the store shopping. In the ER, pt was found to have MADISON with a BUN/Cr of 59/2.7, elevated troponin of .05 with no acute changes on his EKG, pt denied chest pain. CT head was negative for any acute changes. Pt was seen and examined at bedside, pt denies any CP, SOB, palpitations. He is alert although confused and does not know why he is in the hospital. (26 May 2020 21:13)      NEURO:  HX AS PER HIS NIECE WHO I SPOKE WITH THIS AM:  patient is a 89 yo man with baseline dementia x 1 yr who was adm b/c of fall while she was in the store. he wandered from the car and fell.  he was not exhibiting a change in mental status at the time.  there was no LOC. He was at his normal baseline asking to go home.  she notes that it was presumably a mechanical fall because recently he has had ankle swelling and leg pain.  he is in MADISON and has elevated troponin. cxr w/ bilateral opacities. COVID is pending.  CTH no acute change        Vital Signs Last 24 Hrs  T(C): 36.2 (26 May 2020 20:38), Max: 36.2 (26 May 2020 20:38)  T(F): 97.1 (26 May 2020 20:38), Max: 97.1 (26 May 2020 20:38)  HR: 84 (27 May 2020 06:35) (76 - 87)  BP: 208/96 (27 May 2020 06:35) (171/79 - 214/92)  BP(mean): --  RR: 18 (27 May 2020 06:35) (16 - 18)  SpO2: 98% (27 May 2020 06:35) (97% - 99%)    Neurological Exam:   Mental status: Awake, alert. confused and unable to provide hx  Cranial nerves: Pupils equally round and reactive to light, visual fields full, no nystagmus, extraocular muscles intact, V1 through V3 intact bilaterally and symmetric, face symmetric, hearing intact to finger rub, palate elevation symmetric, tongue was midline.  Motor:  MRC grading 5/5 b/l UE/LE.   strength 5/5.  Normal tone and bulk.  No abnormal movements.    Sensation: Intact to light touch,           Medications  cefTRIAXone   IVPB 1000 milliGRAM(s) IV Intermittent every 24 hours  heparin SubCutaneous Injection - Peds 5000 Unit(s) SubCutaneous every 8 hours  sodium chloride 0.9%. 1000 milliLiter(s) IV Continuous <Continuous>      Lab  05-26    140  |  108  |  59<H>  ----------------------------<  90  5.0   |  22  |  2.7<H>    Ca    8.7      26 May 2020 18:21  Mg     2.2     05-26    TPro  6.3  /  Alb  3.9  /  TBili  0.3  /  DBili  x   /  AST  15  /  ALT  11  /  AlkPhos  232<H>  05-26                          11.8   7.26  )-----------( 182      ( 26 May 2020 18:21 )             35.3     LIVER FUNCTIONS - ( 26 May 2020 18:21 )  Alb: 3.9 g/dL / Pro: 6.3 g/dL / ALK PHOS: 232 U/L / ALT: 11 U/L / AST: 15 U/L / GGT: x             2.2        Radiology  CT Head No Cont:   EXAM:  CT BRAIN            PROCEDURE DATE:  05/26/2020            INTERPRETATION:  CLINICAL HISTORY/REASON FOR EXAM: Trauma    Technique: Noncontrast head CT.  Contiguous unenhanced CT axial images of the head from the base to the vertex with coronal and sagittal reformats.    Comparison: None    Findings:    There is prominence of the ventricles, cortical sulci, and basal cisterns consistent with parenchymal volume loss compatible with the patient's age.    Gray-white differentiation is maintained.    Patchy areas of hypodensity in the periventricular and subcortical white matter, consistent with chronic microvascular ischemic disease.     No evidence of acute intracranial hemorrhage, territorial infarct, or significant space-occupying lesion.     Calcifications are noted in the region of the carotid siphons    Beam hardening artifact is noted overlying the brain stem and posterior fossa which is inherent to CT in this location. Aside from mild atrophy, the posterior fossa is otherwiseunremarkable.    No depressed calvarial fracture.    There is mucosal thickening within the sphenoid sinuses compatible with sinusitis, otherwise the visualized portions of the paranasal sinuses and mastoids are unremarkable.         IMPRESSION:     Parenchymal volume loss compatible with the patient's age  No CT evidence of acute fracture, intracranial hemorrhage, midline shift, or mass effect.          Assessment:  89 yo man with baseline dementia adm s/p fall and ams. fall likely mechanical as per niece as he has been c/o leg pain and swelling. there was no LOC  pt found to be in MADISON and also with elevated troponins  likely TME 2/2 MADISON superimposed on underlying dementia    Plan:  start w/u with REEG (ordered)  check b12, tsh and folate  eval/tx  as outpt for dementia

## 2020-05-27 NOTE — PROGRESS NOTE ADULT - ASSESSMENT
Metabolic encephalopathy likely due to UTI/MADISON on CKD?  -continue IVF and IV abx  -follow up cultures   -repeat bmp; monitor lytes  -pt eval   -neuro eval recommended EEG    s/p fall - mechanical as per EMS  -pt's niece isn't sure as she wasn't with him   -cardio eval appreciated  -check echo, ortho BP's and d/c tele    MADISON vs. CKD IV?  -pt's niece says pt has some element of CKD but doesn't know his baseline creatinine  She will try and get me the name of his PMD  -continue gentle hydration and repeat labs in am    UTI  -continue IV abx and follow up cultures    LLE edema   -check doppler     Abnormal cxr suspicious for covid -19  -follow up swab results     Progress Note Handoff  Pending Consults: PT  Pending Tests: labs, le doppler  Pending Results: covid  Family Discussion: discussed doppler, labs and possibility of covid with pt's niece - in agreement with plan of care   Disposition: Home_____/SNF______/Other_____/Unknown at this time__x___    Please call me with any questions at extension [phone]

## 2020-05-27 NOTE — PHYSICAL THERAPY INITIAL EVALUATION ADULT - SPECIFY REASON(S)
As discussed with MD, patient still in ED and very confused at this time; okay to hold PT and complete evaluation when patient brought up to medical floor.

## 2020-05-27 NOTE — CONSULT NOTE ADULT - SUBJECTIVE AND OBJECTIVE BOX
CARDIOLOGY CONSULT NOTE     CHIEF COMPLAINT/REASON FOR CONSULT:    HPI:  Pt is a 89 YOM with a pmh of dementia presented to the ER with a chief complaitn of fall. History obtained from chart as pt is a poor historian. Pt was apparently found in the parking lot of [employer] after a fall where he was supposed to be waiting in the car while his niece was in the store shopping. In the ER, pt was found to have MADISON with a BUN/Cr of 59/2.7, elevated troponin of .05 with no acute changes on his EKG, pt denied chest pain. CT head was negative for any acute changes. Pt was seen and examined at bedside, pt denies any CP, SOB, palpitations. He is alert although confused and does not know why he is in the hospital. (26 May 2020 21:13)      PAST MEDICAL & SURGICAL HISTORY:  Dementia  No significant past surgical history      Cardiac Risks:   [ ]HTN, [ ] DM, [ ] Smoking, [ ] FH,  [ ] Lipids        MEDICATIONS:  MEDICATIONS  (STANDING):  cefTRIAXone   IVPB 1000 milliGRAM(s) IV Intermittent every 24 hours  heparin SubCutaneous Injection - Peds 5000 Unit(s) SubCutaneous every 8 hours  sodium chloride 0.9%. 1000 milliLiter(s) (75 mL/Hr) IV Continuous <Continuous>      FAMILY HISTORY:  No pertinent family history in first degree relatives      SOCIAL HISTORY:      [ ] Marital status  single  Allergies    No Known Allergies        	    REVIEW OF SYSTEMS:  CONSTITUTIONAL: No fever, weight loss, or fatigue    	      PHYSICAL EXAM:  T(C): 36.2 (05-26-20 @ 20:38), Max: 36.2 (05-26-20 @ 20:38)  HR: 84 (05-27-20 @ 06:35) (76 - 87)  BP: 208/96 (05-27-20 @ 06:35) (171/79 - 214/92)  RR: 18 (05-27-20 @ 06:35) (16 - 18)  SpO2: 98% (05-27-20 @ 06:35) (97% - 99%)  Wt(kg): --  I&O's Summary      Appearance: Normal	  Psychiatry: A & O x 3, Mood & affect appropriate  HEENT:   Normal oral mucosa, PERRL, EOMI	  Lymphatic: No lymphadenopathy  Cardiovascular: Normal S1 S2,RRR, No JVD, i/vi zenia lsb  Respiratory: Lungs clear to auscultation	  Gastrointestinal:  Soft, Non-tender, + BS	  Skin: No rashes, No ecchymoses, No cyanosis	  Neurologic: Non-focal  Extremities: Normal range of motion, No clubbing, cyanosis tr edema  Vascular: Peripheral pulses palpable 2+ bilaterally      ECG:  	not available    	  LABS:	 	    CARDIAC MARKERS:                                    11.9   6.83  )-----------( 197      ( 27 May 2020 06:38 )             37.1     05-27    144  |  111<H>  |  54<H>  ----------------------------<  90  4.5   |  21  |  2.5<H>    Ca    9.0      27 May 2020 06:38  Mg     2.2     05-26    TPro  6.0  /  Alb  3.7  /  TBili  0.5  /  DBili  x   /  AST  21  /  ALT  12  /  AlkPhos  252<H>  05-27    PT/INR - ( 26 May 2020 18:21 )   PT: 11.00 sec;   INR: 0.96 ratio         PTT - ( 26 May 2020 18:21 )  PTT:28.5 sec

## 2020-05-28 LAB
ALBUMIN SERPL ELPH-MCNC: 3.4 G/DL — LOW (ref 3.5–5.2)
ALP SERPL-CCNC: 227 U/L — HIGH (ref 30–115)
ALT FLD-CCNC: 16 U/L — SIGNIFICANT CHANGE UP (ref 0–41)
ANION GAP SERPL CALC-SCNC: 12 MMOL/L — SIGNIFICANT CHANGE UP (ref 7–14)
AST SERPL-CCNC: 46 U/L — HIGH (ref 0–41)
BILIRUB SERPL-MCNC: 0.6 MG/DL — SIGNIFICANT CHANGE UP (ref 0.2–1.2)
BUN SERPL-MCNC: 49 MG/DL — HIGH (ref 10–20)
CALCIUM SERPL-MCNC: 8.6 MG/DL — SIGNIFICANT CHANGE UP (ref 8.5–10.1)
CHLORIDE SERPL-SCNC: 114 MMOL/L — HIGH (ref 98–110)
CK SERPL-CCNC: 1167 U/L — HIGH (ref 0–225)
CO2 SERPL-SCNC: 18 MMOL/L — SIGNIFICANT CHANGE UP (ref 17–32)
CREAT SERPL-MCNC: 2.4 MG/DL — HIGH (ref 0.7–1.5)
GLUCOSE SERPL-MCNC: 73 MG/DL — SIGNIFICANT CHANGE UP (ref 70–99)
HCT VFR BLD CALC: 35.6 % — LOW (ref 42–52)
HGB BLD-MCNC: 11.3 G/DL — LOW (ref 14–18)
MCHC RBC-ENTMCNC: 28.9 PG — SIGNIFICANT CHANGE UP (ref 27–31)
MCHC RBC-ENTMCNC: 31.7 G/DL — LOW (ref 32–37)
MCV RBC AUTO: 91 FL — SIGNIFICANT CHANGE UP (ref 80–94)
NRBC # BLD: 0 /100 WBCS — SIGNIFICANT CHANGE UP (ref 0–0)
PLATELET # BLD AUTO: 175 K/UL — SIGNIFICANT CHANGE UP (ref 130–400)
POTASSIUM SERPL-MCNC: 5 MMOL/L — SIGNIFICANT CHANGE UP (ref 3.5–5)
POTASSIUM SERPL-SCNC: 5 MMOL/L — SIGNIFICANT CHANGE UP (ref 3.5–5)
PROT SERPL-MCNC: 5.6 G/DL — LOW (ref 6–8)
RBC # BLD: 3.91 M/UL — LOW (ref 4.7–6.1)
RBC # FLD: 14.5 % — SIGNIFICANT CHANGE UP (ref 11.5–14.5)
SODIUM SERPL-SCNC: 144 MMOL/L — SIGNIFICANT CHANGE UP (ref 135–146)
TROPONIN T SERPL-MCNC: 0.08 NG/ML — CRITICAL HIGH
WBC # BLD: 5.79 K/UL — SIGNIFICANT CHANGE UP (ref 4.8–10.8)
WBC # FLD AUTO: 5.79 K/UL — SIGNIFICANT CHANGE UP (ref 4.8–10.8)

## 2020-05-28 PROCEDURE — 99233 SBSQ HOSP IP/OBS HIGH 50: CPT

## 2020-05-28 RX ORDER — SODIUM BICARBONATE 1 MEQ/ML
650 SYRINGE (ML) INTRAVENOUS EVERY 8 HOURS
Refills: 0 | Status: DISCONTINUED | OUTPATIENT
Start: 2020-05-28 | End: 2020-05-29

## 2020-05-28 RX ORDER — SODIUM CHLORIDE 9 MG/ML
1000 INJECTION, SOLUTION INTRAVENOUS
Refills: 0 | Status: DISCONTINUED | OUTPATIENT
Start: 2020-05-28 | End: 2020-05-29

## 2020-05-28 RX ADMIN — HEPARIN SODIUM 5000 UNIT(S): 5000 INJECTION INTRAVENOUS; SUBCUTANEOUS at 21:32

## 2020-05-28 RX ADMIN — HEPARIN SODIUM 5000 UNIT(S): 5000 INJECTION INTRAVENOUS; SUBCUTANEOUS at 14:10

## 2020-05-28 RX ADMIN — SODIUM CHLORIDE 75 MILLILITER(S): 9 INJECTION, SOLUTION INTRAVENOUS at 14:06

## 2020-05-28 RX ADMIN — HEPARIN SODIUM 5000 UNIT(S): 5000 INJECTION INTRAVENOUS; SUBCUTANEOUS at 05:38

## 2020-05-28 RX ADMIN — SODIUM CHLORIDE 75 MILLILITER(S): 9 INJECTION INTRAMUSCULAR; INTRAVENOUS; SUBCUTANEOUS at 02:28

## 2020-05-28 RX ADMIN — CEFTRIAXONE 100 MILLIGRAM(S): 500 INJECTION, POWDER, FOR SOLUTION INTRAMUSCULAR; INTRAVENOUS at 01:56

## 2020-05-28 RX ADMIN — Medication 650 MILLIGRAM(S): at 21:32

## 2020-05-28 RX ADMIN — Medication 0.1 MILLIGRAM(S): at 23:07

## 2020-05-28 NOTE — CONSULT NOTE ADULT - ASSESSMENT
Pt with OMEGA on CKD 4, dementia admitted after being found on the ground mechanical fall.    Omega - creat baseline is 2.3 mg% from 2018.  - prerenal with mild retention on Bladder sono ~ 200 cc  - cont HYPOTONIC IVF - change NS to 1/2 NS at 75 cc/hr  - , cont to trend  - low K diet, encourage po fluid intake  - kidney sono no hydro, echogenic kidneys  - check phos, iPTH  MEt acidosis - add Na bicarb 650 po q8h    Metabolic encephalopathy likely due to UTI/OMEGA on CKD, dementia  -continue IVF and IV abx  -neuro eval recommended EEG    s/p fall - mechanical as per EMS  -cardio eval appreciated  -check echo, ortho BP's and d/c tele    UTI--continue IV abx and follow up cultures    Abnormal cxr suspicious for covid -19  -follow up swab results     Thank you

## 2020-05-28 NOTE — CONSULT NOTE ADULT - SUBJECTIVE AND OBJECTIVE BOX
NEPHROLOGY CONSULTATION NOTE    Pt is a 89 YOM with a pmh of dementia presented to the ER with a chief complaint of fall. History obtained from chart as pt is a poor historian. Pt was apparently found in the parking lot of [employer] after a fall where he was supposed to be waiting in the car while his niece was in the store shopping. In the ER, pt was found to have MADISON with a BUN/Cr of 59/2.7, elevated troponin of .05 with no acute changes on his EKG, pt denied chest pain. CT head was negative for any acute changes. Pt was seen and examined at bedside, pt denies any CP, SOB, palpitations. He is alert although confused and does not know why he is in the hospital.       PAST MEDICAL & SURGICAL HISTORY:  Dementia  No significant past surgical history    Allergies:  No Known Allergies    Home Medications Reviewed  Hospital Medications:   MEDICATIONS  (STANDING):  cefTRIAXone   IVPB 1000 milliGRAM(s) IV Intermittent every 24 hours  heparin SubCutaneous Injection - Peds 5000 Unit(s) SubCutaneous every 8 hours  LORazepam   Injectable 1 milliGRAM(s) IntraMuscular once  sodium chloride 0.9%. 1000 milliLiter(s) (75 mL/Hr) IV Continuous <Continuous>      SOCIAL HISTORY:  Denies ETOH,Smoking,   FAMILY HISTORY:  No pertinent family history in first degree relatives        REVIEW OF SYSTEMS:  Unable to obtain      VITALS:  T(F): 98.8 (20 @ 05:21), Max: 98.8 (20 @ 05:21)  HR: 88 (20 @ 05:21)  BP: 131/84 (20 @ 05:21)  RR: 18 (20 @ 05:21)  SpO2: 98% (20 @ 05:21)     @ 07:01  -   @ 07:00  --------------------------------------------------------  IN: 1000 mL / OUT: 150 mL / NET: 850 mL            I&O's Detail    27 May 2020 07:  -  28 May 2020 07:00  --------------------------------------------------------  IN:    sodium chloride 0.9%.: 1000 mL  Total IN: 1000 mL    OUT:    Voided: 150 mL  Total OUT: 150 mL    Total NET: 850 mL            PHYSICAL EXAM:  Constitutional: NAD  Neck: No JVD  Respiratory: CTA  Cardiovascular: S1, S2, RRR  Gastrointestinal: BS+, soft, NT/ND  Extremities: No peripheral edema  Neurological: Awake  Psychiatric: Normal mood, normal affect  :  No pino  Vascular Access:    LABS:      144  |  114<H>  |  49<H>  ----------------------------<  73  5.0   |  18  |  2.4<H>    Ca    8.6      28 May 2020 07:04  Mg     2.2         TPro  5.6<L>  /  Alb  3.4<L>  /  TBili  0.6  /  DBili      /  AST  46<H>  /  ALT  16  /  AlkPhos  227<H>      Creatinine Trend: 2.4 <--, 2.5 <--, 2.7 <--                        11.3   5.79  )-----------( 175      ( 28 May 2020 07:04 )             35.6     Urine Studies:  Urinalysis Basic - ( 26 May 2020 18:21 )    Color: Yellow / Appearance: Clear / S.015 / pH:   Gluc:  / Ketone: Negative  / Bili: Negative / Urobili: 0.2 mg/dL   Blood:  / Protein: 100 mg/dL / Nitrite: Negative   Leuk Esterase: Large / RBC: 3-5 /HPF / WBC 10-25 /HPF   Sq Epi:  / Non Sq Epi: Few /HPF / Bacteria: Few                RADIOLOGY & ADDITIONAL STUDIES:    < from: US Kidney and Bladder (20 @ 23:17) >  Poor visualization of both kidneys secondary to shadowing from overlying bowel gas.    1.  No sonographic evidence of hydronephrosis.    2.  Echogenic right renal parenchyma suggestive of medical renal disease.    3.  Multiple, bilateral renal cysts.     4.  Patient unable to void with bladder volume of 280 mL. Correlate for urinary retention.    < end of copied text >    < from: Xray Chest 1 View-PORTABLE IMMEDIATE (20 @ 19:16) >    Bibasilar opacities, which may be seen with atypical infection including viral pneumonia.    < end of copied text >

## 2020-05-28 NOTE — PROGRESS NOTE ADULT - ASSESSMENT
Metabolic encephalopathy likely due to UTI/MADISON on CKD?  -continue IVF and IV abx  -follow up cultures   -repeat bmp; monitor lytes  -pt eval pending  -neuro eval recommended EEG which was done this morning but results still pending  -covid -19 negative     s/p fall - mechanical as per EMS  -pt's niece isn't sure as she wasn't with him   -cardio eval appreciated  -follow up echo  -check ortho BP's when pt is more cooperative   -check repeat ck level     MADISON vs. CKD IV?  -pt's niece says pt has some element of CKD but doesn't know his baseline creatinine  She will try and get me the name of his PMD  -continue gentle hydration and repeat labs in am  -as per nephrology, baseline cr is 2.3 in 2018    UTI  -continue IV abx and follow up cultures    LLE edema   -LE doppler negative for DVT  -follow up echo results        Progress Note Handoff  Pending Consults: PT  Pending Tests: labs,   Pending Results: labs  Family Discussion: discussed labs and IV abx with pt's niece- she would like him home when medically stable - in agreement with plan of care   Disposition: Home_x____/SNF______/Other_____/Unknown at this time____    Please call me with any questions at extension [phone]

## 2020-05-28 NOTE — PROGRESS NOTE ADULT - SUBJECTIVE AND OBJECTIVE BOX
[place]  89y  Male      Patient is a 89y old Male who presents with an elevated troponin (27 May 2020 09:06)      INTERVAL HPI/OVERNIGHT EVENTS:  Patient seen and examined earlier this morning.  Lying in bed.  Less confused than yesterday. Blood work reviewed.  Await PT and urine cultures      REVIEW OF SYSTEMS:  unable to determine due to mental status       Vital Signs Last 24 Hrs  T(C): 37.1 (28 May 2020 05:21), Max: 37.1 (28 May 2020 05:21)  T(F): 98.8 (28 May 2020 05:21), Max: 98.8 (28 May 2020 05:21)  HR: 88 (28 May 2020 05:21) (70 - 124)  BP: 131/84 (28 May 2020 05:21) (131/84 - 234/100)  BP(mean): --  RR: 18 (28 May 2020 05:21) (17 - 18)  SpO2: 98% (28 May 2020 05:21) (98% - 98%)      PHYSICAL EXAM:  GENERAL: NAD, well-groomed, well-developed  HEAD:  Atraumatic, Normocephalic  EYES: EOMI, PERRLA, conjunctiva and sclera clear  ENMT: No tonsillar erythema, exudates, or enlargement; Moist mucous membranes, Good dentition, No lesions  NECK: Supple, No JVD, Normal thyroid  NERVOUS SYSTEM:  awake, confused, moves all extremities  CHEST/LUNG: Clear to percussion bilaterally; No rales, rhonchi, wheezing, or rubs  HEART: Regular rate and rhythm; No murmurs, rubs, or gallops  ABDOMEN: Soft, Nontender, Nondistended; Bowel sounds present  EXTREMITIES:  2+ Peripheral Pulses, No clubbing, cyanosis.  LLE edema +  LYMPH: No lymphadenopathy noted  SKIN: multiple ecchymotic areas    Consultant(s) Notes Reviewed:  [x ] YES  [ ] NO  Care Discussed with Consultants/Other Providers [ x] YES  [ ] NO    LAB:                          11.3   5.79  )-----------( 175      ( 28 May 2020 07:04 )             35.6         144  |  114<H>  |  49<H>  ----------------------------<  73  5.0   |  18  |  2.4<H>    Ca    8.6      28 May 2020 07:04  Mg     2.2         TPro  5.6<L>  /  Alb  3.4<L>  /  TBili  0.6  /  DBili  x   /  AST  46<H>  /  ALT  16  /  AlkPhos  227<H>        Urinalysis Basic - ( 26 May 2020 18:21 )    Color: Yellow / Appearance: Clear / S.015 / pH: x  Gluc: x / Ketone: Negative  / Bili: Negative / Urobili: 0.2 mg/dL   Blood: x / Protein: 100 mg/dL / Nitrite: Negative   Leuk Esterase: Large / RBC: 3-5 /HPF / WBC 10-25 /HPF   Sq Epi: x / Non Sq Epi: Few /HPF / Bacteria: Few        RADIOLOGY & ADDITIONAL TESTS:  Imaging Personally Reviewed:  [x] YES  [ ] NO      MEDICATIONS  (STANDING):  cefTRIAXone   IVPB 1000 milliGRAM(s) IV Intermittent every 24 hours  heparin SubCutaneous Injection - Peds 5000 Unit(s) SubCutaneous every 8 hours  LORazepam   Injectable 1 milliGRAM(s) IntraMuscular once  sodium bicarbonate 650 milliGRAM(s) Oral every 8 hours  sodium chloride 0.45%. 1000 milliLiter(s) (75 mL/Hr) IV Continuous <Continuous>    MEDICATIONS  (PRN):  haloperidol    Injectable 1 milliGRAM(s) IntraMuscular every 6 hours PRN Agitation

## 2020-05-29 VITALS
HEIGHT: 65 IN | HEART RATE: 67 BPM | SYSTOLIC BLOOD PRESSURE: 182 MMHG | WEIGHT: 155.65 LBS | DIASTOLIC BLOOD PRESSURE: 81 MMHG | TEMPERATURE: 98 F

## 2020-05-29 LAB
ALBUMIN SERPL ELPH-MCNC: 3.1 G/DL — LOW (ref 3.5–5.2)
ALP SERPL-CCNC: 199 U/L — HIGH (ref 30–115)
ALT FLD-CCNC: 15 U/L — SIGNIFICANT CHANGE UP (ref 0–41)
ANION GAP SERPL CALC-SCNC: 9 MMOL/L — SIGNIFICANT CHANGE UP (ref 7–14)
AST SERPL-CCNC: 31 U/L — SIGNIFICANT CHANGE UP (ref 0–41)
BILIRUB SERPL-MCNC: 0.4 MG/DL — SIGNIFICANT CHANGE UP (ref 0.2–1.2)
BUN SERPL-MCNC: 46 MG/DL — HIGH (ref 10–20)
CALCIUM SERPL-MCNC: 7.8 MG/DL — LOW (ref 8.5–10.1)
CHLORIDE SERPL-SCNC: 111 MMOL/L — HIGH (ref 98–110)
CK SERPL-CCNC: 778 U/L — HIGH (ref 0–225)
CO2 SERPL-SCNC: 20 MMOL/L — SIGNIFICANT CHANGE UP (ref 17–32)
CREAT SERPL-MCNC: 2.3 MG/DL — HIGH (ref 0.7–1.5)
GLUCOSE SERPL-MCNC: 82 MG/DL — SIGNIFICANT CHANGE UP (ref 70–99)
HCT VFR BLD CALC: 30.8 % — LOW (ref 42–52)
HGB BLD-MCNC: 10.2 G/DL — LOW (ref 14–18)
MCHC RBC-ENTMCNC: 30 PG — SIGNIFICANT CHANGE UP (ref 27–31)
MCHC RBC-ENTMCNC: 33.1 G/DL — SIGNIFICANT CHANGE UP (ref 32–37)
MCV RBC AUTO: 90.6 FL — SIGNIFICANT CHANGE UP (ref 80–94)
NRBC # BLD: 0 /100 WBCS — SIGNIFICANT CHANGE UP (ref 0–0)
PLATELET # BLD AUTO: 162 K/UL — SIGNIFICANT CHANGE UP (ref 130–400)
POTASSIUM SERPL-MCNC: 4.4 MMOL/L — SIGNIFICANT CHANGE UP (ref 3.5–5)
POTASSIUM SERPL-SCNC: 4.4 MMOL/L — SIGNIFICANT CHANGE UP (ref 3.5–5)
PROT SERPL-MCNC: 5.1 G/DL — LOW (ref 6–8)
RBC # BLD: 3.4 M/UL — LOW (ref 4.7–6.1)
RBC # FLD: 14.6 % — HIGH (ref 11.5–14.5)
SODIUM SERPL-SCNC: 140 MMOL/L — SIGNIFICANT CHANGE UP (ref 135–146)
WBC # BLD: 5.45 K/UL — SIGNIFICANT CHANGE UP (ref 4.8–10.8)
WBC # FLD AUTO: 5.45 K/UL — SIGNIFICANT CHANGE UP (ref 4.8–10.8)

## 2020-05-29 PROCEDURE — 95819 EEG AWAKE AND ASLEEP: CPT | Mod: 26

## 2020-05-29 PROCEDURE — 99239 HOSP IP/OBS DSCHRG MGMT >30: CPT

## 2020-05-29 RX ORDER — CEFDINIR 250 MG/5ML
1 POWDER, FOR SUSPENSION ORAL
Qty: 6 | Refills: 0
Start: 2020-05-29 | End: 2020-05-31

## 2020-05-29 RX ORDER — AMLODIPINE BESYLATE 2.5 MG/1
5 TABLET ORAL ONCE
Refills: 0 | Status: COMPLETED | OUTPATIENT
Start: 2020-05-29 | End: 2020-05-29

## 2020-05-29 RX ORDER — AMLODIPINE BESYLATE 2.5 MG/1
1 TABLET ORAL
Qty: 30 | Refills: 0
Start: 2020-05-29

## 2020-05-29 RX ADMIN — HEPARIN SODIUM 5000 UNIT(S): 5000 INJECTION INTRAVENOUS; SUBCUTANEOUS at 05:42

## 2020-05-29 RX ADMIN — HEPARIN SODIUM 5000 UNIT(S): 5000 INJECTION INTRAVENOUS; SUBCUTANEOUS at 16:05

## 2020-05-29 RX ADMIN — SODIUM CHLORIDE 75 MILLILITER(S): 9 INJECTION, SOLUTION INTRAVENOUS at 05:42

## 2020-05-29 RX ADMIN — CEFTRIAXONE 100 MILLIGRAM(S): 500 INJECTION, POWDER, FOR SOLUTION INTRAMUSCULAR; INTRAVENOUS at 00:25

## 2020-05-29 RX ADMIN — Medication 650 MILLIGRAM(S): at 05:42

## 2020-05-29 RX ADMIN — Medication 650 MILLIGRAM(S): at 16:05

## 2020-05-29 RX ADMIN — AMLODIPINE BESYLATE 5 MILLIGRAM(S): 2.5 TABLET ORAL at 12:15

## 2020-05-29 NOTE — DISCHARGE NOTE PROVIDER - NSDCCPCAREPLAN_GEN_ALL_CORE_FT
PRINCIPAL DISCHARGE DIAGNOSIS  Diagnosis: MADISON (acute kidney injury)  Assessment and Plan of Treatment: prerenal with mild retention on Bladder sono ~ 200 cc  creat back to baseline 2.3      SECONDARY DISCHARGE DIAGNOSES  Diagnosis: Acute UTI  Assessment and Plan of Treatment: PO abx upon d/c PRINCIPAL DISCHARGE DIAGNOSIS  Diagnosis: MADISON (acute kidney injury)  Assessment and Plan of Treatment: creatinine is back to baseline 2.3  please follow up with your PMD      SECONDARY DISCHARGE DIAGNOSES  Diagnosis: Acute UTI  Assessment and Plan of Treatment: take antibiotics as prescribed  follow up with pmd

## 2020-05-29 NOTE — PROGRESS NOTE ADULT - SUBJECTIVE AND OBJECTIVE BOX
Nephrology progress note    THIS IS AN INCOMPLETE NOTE . FULL NOTE TO FOLLOW SHORTLY    Patient is seen and examined, events over the last 24 h noted .    Allergies:  No Known Allergies    Hospital Medications:   MEDICATIONS  (STANDING):  cefTRIAXone   IVPB 1000 milliGRAM(s) IV Intermittent every 24 hours  heparin SubCutaneous Injection - Peds 5000 Unit(s) SubCutaneous every 8 hours  LORazepam   Injectable 1 milliGRAM(s) IntraMuscular once  sodium bicarbonate 650 milliGRAM(s) Oral every 8 hours  sodium chloride 0.45%. 1000 milliLiter(s) (75 mL/Hr) IV Continuous <Continuous>        VITALS:  T(F): 97.9 (20 @ 05:00), Max: 98.3 (20 @ 14:18)  HR: 75 (20 @ 05:00)  BP: 185/94 (20 @ 05:00)  RR: 18 (20 @ 05:00)  SpO2: --  Wt(kg): --     @ 07:01  -   @ 07:00  --------------------------------------------------------  IN: 1000 mL / OUT: 150 mL / NET: 850 mL          PHYSICAL EXAM:  Constitutional: NAD  HEENT: anicteric sclera, oropharynx clear, MMM  Neck: No JVD  Respiratory: CTAB, no wheezes, rales or rhonchi  Cardiovascular: S1, S2, RRR  Gastrointestinal: BS+, soft, NT/ND  Extremities: No cyanosis or clubbing. No peripheral edema  :  No pino.   Skin: No rashes    LABS:      144  |  114<H>  |  49<H>  ----------------------------<  73  5.0   |  18  |  2.4<H>    Ca    8.6      28 May 2020 07:04    TPro  5.6<L>  /  Alb  3.4<L>  /  TBili  0.6  /  DBili      /  AST  46<H>  /  ALT  16  /  AlkPhos  227<H>                            11.3   5.79  )-----------( 175      ( 28 May 2020 07:04 )             35.6       Urine Studies:  Urinalysis Basic - ( 26 May 2020 18:21 )    Color: Yellow / Appearance: Clear / S.015 / pH:   Gluc:  / Ketone: Negative  / Bili: Negative / Urobili: 0.2 mg/dL   Blood:  / Protein: 100 mg/dL / Nitrite: Negative   Leuk Esterase: Large / RBC: 3-5 /HPF / WBC 10-25 /HPF   Sq Epi:  / Non Sq Epi: Few /HPF / Bacteria: Few        RADIOLOGY & ADDITIONAL STUDIES: Nephrology progress note  Patient is seen and examined, events over the last 24 h noted .  lying in bed lethargic     Allergies:  No Known Allergies    Hospital Medications:   MEDICATIONS  (STANDING):  cefTRIAXone   IVPB 1000 milliGRAM(s) IV Intermittent every 24 hours  heparin SubCutaneous Injection - Peds 5000 Unit(s) SubCutaneous every 8 hours  LORazepam   Injectable 1 milliGRAM(s) IntraMuscular once  sodium bicarbonate 650 milliGRAM(s) Oral every 8 hours  sodium chloride 0.45%. 1000 milliLiter(s) (75 mL/Hr) IV Continuous <Continuous>        VITALS:  T(F): 97.9 (20 @ 05:00), Max: 98.3 (20 @ 14:18)  HR: 75 (20 @ 05:00)  BP: 185/94 (20 @ 05:00)  RR: 18 (20 @ 05:00)       @ 07:01  -   @ 07:00  --------------------------------------------------------  IN: 1000 mL / OUT: 150 mL / NET: 850 mL          PHYSICAL EXAM:  Constitutional: NAD  HEENT: anicteric sclera, oropharynx clear, MMM  Neck: No JVD  Respiratory: CTAB, no wheezes, rales or rhonchi  Cardiovascular: S1, S2, RRR  Gastrointestinal: BS+, soft, NT/ND  Extremities: No cyanosis or clubbing. No peripheral edema  :  No pino.   Skin: No rashes    LABS:        140  |  111<H>  |  46<H>  ----------------------------<  82  4.4   |  20  |  2.3<H>    Ca    7.8<L>      29 May 2020 06:22    TPro  5.1<L>  /  Alb  3.1<L>  /  TBili  0.4  /  DBili  x   /  AST  31  /  ALT  15  /  AlkPhos  199<H>      144  |  114<H>  |  49<H>  ----------------------------<  73  5.0   |  18  |  2.4<H>    Creatinine Trend: 2.3<--, 2.4<--, 2.5<--, 2.7<--    Ca    8.6      28 May 2020 07:04    TPro  5.6<L>  /  Alb  3.4<L>  /  TBili  0.6  /  DBili      /  AST  46<H>  /  ALT  16  /  AlkPhos  227<H>                            11.3   5.79  )-----------( 175      ( 28 May 2020 07:04 )             35.6       Urine Studies:  Urinalysis Basic - ( 26 May 2020 18:21 )    Color: Yellow / Appearance: Clear / S.015 / pH:   Gluc:  / Ketone: Negative  / Bili: Negative / Urobili: 0.2 mg/dL   Blood:  / Protein: 100 mg/dL / Nitrite: Negative   Leuk Esterase: Large / RBC: 3-5 /HPF / WBC 10-25 /HPF   Sq Epi:  / Non Sq Epi: Few /HPF / Bacteria: Few        RADIOLOGY & ADDITIONAL STUDIES:

## 2020-05-29 NOTE — PROGRESS NOTE ADULT - ASSESSMENT
Pt with OMEGA on CKD 4, dementia admitted after being found on the ground mechanical fall.    Omega - creat baseline is 2.3 mg% from 2018.  - prerenal with mild retention on Bladder sono ~ 200 cc  - cont HYPOTONIC IVF - change NS to 1/2 NS at 75 cc/hr  - , cont to trend  - low K diet, encourage po fluid intake  - kidney sono no hydro, echogenic kidneys  - check phos, iPTH  MEt acidosis - add Na bicarb 650 po q8h    Metabolic encephalopathy likely due to UTI/OMEGA on CKD, dementia  -continue IVF and IV abx  -neuro eval recommended EEG    s/p fall - mechanical as per EMS  -cardio eval appreciated  -check echo, ortho BP's and d/c tele    UTI--continue IV abx and follow up cultures    Abnormal cxr suspicious for covid -19  -follow up swab results Pt with OMEGA on CKD 4, dementia admitted after being found on the ground mechanical fall.    Omega - creat baseline is 2.3 mg% from 2018./ at baseline now   - prerenal with mild retention on Bladder sono ~ 200 cc  - if tolerating po can stop IVF   - , cont to trend  - low K diet, encourage po fluid intake  - kidney sono no hydro, echogenic kidneys  - check phos, iPTH  MEt acidosis - add Na bicarb 650 po q8h    Metabolic encephalopathy likely due to UTI/OMEGA on CKD, dementia  -on antibx     s/p fall - mechanical as per EMS  -cardio eval appreciated      UTI--on  abx and follow up cultures      will sign off will need OP renal follow up Pt with OMEGA on CKD 4, dementia admitted after being found on the ground mechanical fall.    Omega - creat baseline is 2.3 mg% from 2018./ at baseline now   - prerenal with mild retention on Bladder sono ~ 200 cc  - if tolerating po can stop IVF   - , cont to trend  - low K diet, encourage po fluid intake  - kidney sono no hydro, echogenic kidneys  - check phos, iPTH  - BP noted add amlodipine 5 mg po q24h  MEt acidosis - add Na bicarb 650 po q8h    Metabolic encephalopathy likely due to UTI/OMEGA on CKD, dementia  -on antibx     s/p fall - mechanical as per EMS  -cardio eval appreciated      UTI--on  abx and follow up cultures      will sign off will need OP renal follow up

## 2020-05-29 NOTE — DISCHARGE NOTE PROVIDER - NSDCFUADDINST_GEN_ALL_CORE_FT
if any fever over 102 , call office or report to nearest ER  follow up with PMD in 1-2 weeks   take antibiotics as directed

## 2020-05-29 NOTE — CHART NOTE - NSCHARTNOTEFT_GEN_A_CORE
REEG REVIEW:    REEG WITH GENERALIZED SLOWING BUT NON EPILEPTIFORM C/W METABOLIC ENCEPHALOPATHY LIKELY ON UNDERLYING DEMENTIA  THERE IS NO INDICATION FOR FURTHER W/U AT THIS TIME  PLEASE CALL WITH ANY QUESTIONS REEG REVIEW:    REEG WITH GENERALIZED SLOWING BUT NON EPILEPTIFORM C/W METABOLIC ENCEPHALOPATHY LIKELY ON UNDERLYING DEMENTIA  THERE IS NO INDICATION FOR FURTHER W/U AT THIS TIME FROM NEURO STANDPOINT  PLEASE CALL WITH ANY QUESTIONS

## 2020-05-29 NOTE — DISCHARGE NOTE PROVIDER - NSDCMRMEDTOKEN_GEN_ALL_CORE_FT
cefdinir 300 mg oral capsule: 1 cap(s) orally 2 times a day   Norvasc 2.5 mg oral tablet: 1 tab(s) orally once a day

## 2020-05-29 NOTE — DISCHARGE NOTE PROVIDER - HOSPITAL COURSE
89 YOM with a pmh of dementia presented to the ER with a chief complaitn of fall. History obtained from chart as pt is a poor historian. Pt was apparently found in the parking lot of [employer] after a fall where he was supposed to be waiting in the car while his niece was in the store shopping. In the ER, pt was found to have OMEGA with a BUN/Cr of 59/2.7, elevated troponin of .05 with no acute changes on his EKG, pt denied chest pain. CT head was negative for any acute changes. Pt was seen and examined at bedside, pt denies any CP, SOB, palpitations. He is alert although confused and does not know why he is in the hospital.         neuro eval :         REEG WITH GENERALIZED SLOWING BUT NON EPILEPTIFORM C/W METABOLIC ENCEPHALOPATHY LIKELY ON UNDERLYING DEMENTIA    THERE IS NO INDICATION FOR FURTHER W/U AT THIS TIME FROM NEURO STANDPOINT        nephro:         Omega - creat baseline is 2.3 mg% from 2018.    - prerenal with mild retention on Bladder sono ~ 200 cc        Has UTI - d/c home with po abx 89 YOM with a pmh of dementia presented to the ER with a chief complaitn of fall. History obtained from chart as pt is a poor historian. Pt was apparently found in the parking lot of [employer] after a fall where he was supposed to be waiting in the car while his niece was in the store shopping. The fall was believed to be mechanical as per EMS.  In the ER, pt was found to have OMEGA with a BUN/Cr of 59/2.7, elevated troponin of .05 with no acute changes on his EKG, pt denied chest pain. CT head was negative for any acute changes. Pt was seen and examined at bedside, pt denies any CP, SOB, palpitations. He is alert although confused and does not know why he is in the hospital.         neuro eval :         REEG WITH GENERALIZED SLOWING BUT NON EPILEPTIFORM C/W METABOLIC ENCEPHALOPATHY LIKELY ON UNDERLYING DEMENTIA    THERE IS NO INDICATION FOR FURTHER W/U AT THIS TIME FROM NEURO STANDPOINT        nephro:         Omega - creat baseline is 2.3 mg% from 2018.    - prerenal with mild retention on Bladder sono ~ 200 cc        Has UTI - d/c home with po abx         patient seen and examined this morning.    Lying in bed.  Slightly confused but is calm and cooperative    ambulated with pt today.    discussed d/c planing in detail with Lev martinez - all questions answered.          Vital Signs Last 24 Hrs    T(C): 36.6 (29 May 2020 05:00), Max: 36.8 (28 May 2020 14:18)    T(F): 97.9 (29 May 2020 05:00), Max: 98.3 (28 May 2020 14:18)    HR: 75 (29 May 2020 05:00) (75 - 88)    BP: 185/94 (29 May 2020 05:00) (185/82 - 193/90)    BP(mean): --    RR: 18 (29 May 2020 05:00) (18 - 18)    SpO2: --        PHYSICAL EXAM:    GENERAL: NAD, well-groomed, well-developed    HEAD:  Atraumatic, Normocephalic    EYES: EOMI, PERRLA, conjunctiva and sclera clear    ENMT: No tonsillar erythema, exudates, or enlargement; Moist mucous membranes, Good dentition, No lesions    NECK: Supple, No JVD, Normal thyroid    NERVOUS SYSTEM:  awake, confused, moves all extremities    CHEST/LUNG: Clear to percussion bilaterally; No rales, rhonchi, wheezing, or rubs    HEART: Regular rate and rhythm; No murmurs, rubs, or gallops    ABDOMEN: Soft, Nontender, Nondistended; Bowel sounds present    EXTREMITIES:  2+ Peripheral Pulses, No clubbing, cyanosis.  LLE edema +    LYMPH: No lymphadenopathy noted    SKIN: multiple ecchymotic areas        d/c home today    family not interested in placement     d/c planning took over 55 min    d/c papers done by me

## 2020-05-29 NOTE — DISCHARGE NOTE NURSING/CASE MANAGEMENT/SOCIAL WORK - PATIENT PORTAL LINK FT
You can access the FollowMyHealth Patient Portal offered by [facility] by registering at the following website: [email or web address]. By joining [employer]’s FollowMyHealth portal, you will also be able to view your health information using other applications (apps) compatible with our system.

## 2020-06-01 ENCOUNTER — INPATIENT (INPATIENT)
Facility: HOSPITAL | Age: 85
LOS: 1 days | Discharge: NURSING, REHAB OR LONG-TERM CARE | End: 2020-06-03
Attending: HOSPITALIST | Admitting: HOSPITALIST
Payer: MEDICARE

## 2020-06-01 VITALS
OXYGEN SATURATION: 97 % | RESPIRATION RATE: 18 BRPM | TEMPERATURE: 98 F | WEIGHT: 149.91 LBS | DIASTOLIC BLOOD PRESSURE: 88 MMHG | HEIGHT: 65 IN | SYSTOLIC BLOOD PRESSURE: 203 MMHG | HEART RATE: 68 BPM

## 2020-06-01 DIAGNOSIS — N18.4 CHRONIC KIDNEY DISEASE, STAGE 4 (SEVERE): ICD-10-CM

## 2020-06-01 DIAGNOSIS — E87.2 ACIDOSIS: ICD-10-CM

## 2020-06-01 DIAGNOSIS — Z79.899 OTHER LONG TERM (CURRENT) DRUG THERAPY: ICD-10-CM

## 2020-06-01 DIAGNOSIS — F03.91 UNSPECIFIED DEMENTIA WITH BEHAVIORAL DISTURBANCE: ICD-10-CM
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
## 2020-06-01 DIAGNOSIS — N39.0 URINARY TRACT INFECTION, SITE NOT SPECIFIED: ICD-10-CM
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
## 2020-06-01 DIAGNOSIS — Z91.83 WANDERING IN DISEASES CLASSIFIED ELSEWHERE: ICD-10-CM

## 2020-06-01 DIAGNOSIS — N17.9 ACUTE KIDNEY FAILURE, UNSPECIFIED: ICD-10-CM

## 2020-06-01 DIAGNOSIS — G93.41 METABOLIC ENCEPHALOPATHY: ICD-10-CM

## 2020-06-01 LAB
ALBUMIN SERPL ELPH-MCNC: 4 G/DL — SIGNIFICANT CHANGE UP (ref 3.5–5.2)
ALP SERPL-CCNC: 235 U/L — HIGH (ref 30–115)
ALT FLD-CCNC: 27 U/L — SIGNIFICANT CHANGE UP (ref 0–41)
ANION GAP SERPL CALC-SCNC: 10 MMOL/L — SIGNIFICANT CHANGE UP (ref 7–14)
APPEARANCE UR: CLEAR — SIGNIFICANT CHANGE UP
AST SERPL-CCNC: 41 U/L — SIGNIFICANT CHANGE UP (ref 0–41)
BACTERIA # UR AUTO: ABNORMAL
BASOPHILS # BLD AUTO: 0.02 K/UL — SIGNIFICANT CHANGE UP (ref 0–0.2)
BASOPHILS NFR BLD AUTO: 0.4 % — SIGNIFICANT CHANGE UP (ref 0–1)
BILIRUB SERPL-MCNC: 0.5 MG/DL — SIGNIFICANT CHANGE UP (ref 0.2–1.2)
BILIRUB UR-MCNC: NEGATIVE — SIGNIFICANT CHANGE UP
BUN SERPL-MCNC: 48 MG/DL — HIGH (ref 10–20)
CALCIUM SERPL-MCNC: 9.2 MG/DL — SIGNIFICANT CHANGE UP (ref 8.5–10.1)
CHLORIDE SERPL-SCNC: 107 MMOL/L — SIGNIFICANT CHANGE UP (ref 98–110)
CK SERPL-CCNC: 570 U/L — HIGH (ref 0–225)
CO2 SERPL-SCNC: 21 MMOL/L — SIGNIFICANT CHANGE UP (ref 17–32)
COLOR SPEC: YELLOW — SIGNIFICANT CHANGE UP
CREAT SERPL-MCNC: 2.7 MG/DL — HIGH (ref 0.7–1.5)
DIFF PNL FLD: ABNORMAL
EOSINOPHIL # BLD AUTO: 0.2 K/UL — SIGNIFICANT CHANGE UP (ref 0–0.7)
EOSINOPHIL NFR BLD AUTO: 4 % — SIGNIFICANT CHANGE UP (ref 0–8)
EPI CELLS # UR: ABNORMAL /HPF
GLUCOSE SERPL-MCNC: 96 MG/DL — SIGNIFICANT CHANGE UP (ref 70–99)
GLUCOSE UR QL: NEGATIVE MG/DL — SIGNIFICANT CHANGE UP
GRAN CASTS # UR COMP ASSIST: ABNORMAL /LPF
HCT VFR BLD CALC: 39.4 % — LOW (ref 42–52)
HGB BLD-MCNC: 12.6 G/DL — LOW (ref 14–18)
HYALINE CASTS # UR AUTO: ABNORMAL /LPF
IMM GRANULOCYTES NFR BLD AUTO: 0.4 % — HIGH (ref 0.1–0.3)
KETONES UR-MCNC: NEGATIVE — SIGNIFICANT CHANGE UP
LEUKOCYTE ESTERASE UR-ACNC: ABNORMAL
LYMPHOCYTES # BLD AUTO: 0.87 K/UL — LOW (ref 1.2–3.4)
LYMPHOCYTES # BLD AUTO: 17.4 % — LOW (ref 20.5–51.1)
MAGNESIUM SERPL-MCNC: 2.3 MG/DL — SIGNIFICANT CHANGE UP (ref 1.8–2.4)
MCHC RBC-ENTMCNC: 28.9 PG — SIGNIFICANT CHANGE UP (ref 27–31)
MCHC RBC-ENTMCNC: 32 G/DL — SIGNIFICANT CHANGE UP (ref 32–37)
MCV RBC AUTO: 90.4 FL — SIGNIFICANT CHANGE UP (ref 80–94)
MONOCYTES # BLD AUTO: 0.76 K/UL — HIGH (ref 0.1–0.6)
MONOCYTES NFR BLD AUTO: 15.2 % — HIGH (ref 1.7–9.3)
NEUTROPHILS # BLD AUTO: 3.13 K/UL — SIGNIFICANT CHANGE UP (ref 1.4–6.5)
NEUTROPHILS NFR BLD AUTO: 62.6 % — SIGNIFICANT CHANGE UP (ref 42.2–75.2)
NITRITE UR-MCNC: NEGATIVE — SIGNIFICANT CHANGE UP
NRBC # BLD: 0 /100 WBCS — SIGNIFICANT CHANGE UP (ref 0–0)
PH UR: 6 — SIGNIFICANT CHANGE UP (ref 5–8)
PLATELET # BLD AUTO: 202 K/UL — SIGNIFICANT CHANGE UP (ref 130–400)
POTASSIUM SERPL-MCNC: 5 MMOL/L — SIGNIFICANT CHANGE UP (ref 3.5–5)
POTASSIUM SERPL-SCNC: 5 MMOL/L — SIGNIFICANT CHANGE UP (ref 3.5–5)
PROT SERPL-MCNC: 6.7 G/DL — SIGNIFICANT CHANGE UP (ref 6–8)
PROT UR-MCNC: 100 MG/DL
RBC # BLD: 4.36 M/UL — LOW (ref 4.7–6.1)
RBC # FLD: 14.5 % — SIGNIFICANT CHANGE UP (ref 11.5–14.5)
RBC CASTS # UR COMP ASSIST: SIGNIFICANT CHANGE UP /HPF
SODIUM SERPL-SCNC: 138 MMOL/L — SIGNIFICANT CHANGE UP (ref 135–146)
SP GR SPEC: 1.02 — SIGNIFICANT CHANGE UP (ref 1.01–1.03)
UROBILINOGEN FLD QL: 0.2 MG/DL — SIGNIFICANT CHANGE UP (ref 0.2–0.2)
WBC # BLD: 5 K/UL — SIGNIFICANT CHANGE UP (ref 4.8–10.8)
WBC # FLD AUTO: 5 K/UL — SIGNIFICANT CHANGE UP (ref 4.8–10.8)
WBC UR QL: ABNORMAL /HPF

## 2020-06-01 PROCEDURE — 71045 X-RAY EXAM CHEST 1 VIEW: CPT | Mod: 26

## 2020-06-01 PROCEDURE — 99285 EMERGENCY DEPT VISIT HI MDM: CPT

## 2020-06-01 PROCEDURE — 99223 1ST HOSP IP/OBS HIGH 75: CPT

## 2020-06-01 RX ORDER — CEFTRIAXONE 500 MG/1
1000 INJECTION, POWDER, FOR SOLUTION INTRAMUSCULAR; INTRAVENOUS EVERY 24 HOURS
Refills: 0 | Status: DISCONTINUED | OUTPATIENT
Start: 2020-06-01 | End: 2020-06-02

## 2020-06-01 RX ORDER — AMLODIPINE BESYLATE 2.5 MG/1
2.5 TABLET ORAL DAILY
Refills: 0 | Status: DISCONTINUED | OUTPATIENT
Start: 2020-06-01 | End: 2020-06-02

## 2020-06-01 RX ORDER — SODIUM CHLORIDE 9 MG/ML
1000 INJECTION, SOLUTION INTRAVENOUS ONCE
Refills: 0 | Status: COMPLETED | OUTPATIENT
Start: 2020-06-01 | End: 2020-06-01

## 2020-06-01 RX ORDER — HYDRALAZINE HCL 50 MG
25 TABLET ORAL ONCE
Refills: 0 | Status: COMPLETED | OUTPATIENT
Start: 2020-06-01 | End: 2020-06-01

## 2020-06-01 RX ORDER — HEPARIN SODIUM 5000 [USP'U]/ML
5000 INJECTION INTRAVENOUS; SUBCUTANEOUS EVERY 8 HOURS
Refills: 0 | Status: DISCONTINUED | OUTPATIENT
Start: 2020-06-01 | End: 2020-06-03

## 2020-06-01 RX ORDER — SODIUM CHLORIDE 9 MG/ML
1000 INJECTION INTRAMUSCULAR; INTRAVENOUS; SUBCUTANEOUS
Refills: 0 | Status: DISCONTINUED | OUTPATIENT
Start: 2020-06-01 | End: 2020-06-02

## 2020-06-01 RX ADMIN — Medication 25 MILLIGRAM(S): at 20:14

## 2020-06-01 RX ADMIN — SODIUM CHLORIDE 75 MILLILITER(S): 9 INJECTION INTRAMUSCULAR; INTRAVENOUS; SUBCUTANEOUS at 23:37

## 2020-06-01 RX ADMIN — CEFTRIAXONE 100 MILLIGRAM(S): 500 INJECTION, POWDER, FOR SOLUTION INTRAMUSCULAR; INTRAVENOUS at 22:43

## 2020-06-01 RX ADMIN — SODIUM CHLORIDE 2000 MILLILITER(S): 9 INJECTION, SOLUTION INTRAVENOUS at 19:21

## 2020-06-01 NOTE — ED PROVIDER NOTE - PHYSICAL EXAMINATION
CONSTITUTIONAL: Well-developed; well-nourished; elderly male unkempt disheveled with odor of feces.   SKIN: warm, dry  HEAD: Normocephalic; atraumatic.  EYES: no conjunctival injection. PERRL.   ENT: No nasal discharge; airway clear.  NECK: Supple; non tender.  CARD: S1, S2 normal; no murmurs, gallops, or rubs. Regular rate and rhythm.   RESP: No wheezes, rales or rhonchi.  ABD: soft ntnd  EXT: Normal ROM.  No clubbing, cyanosis or edema.   LYMPH: No acute cervical adenopathy.  NEURO: Alert, oriented to person only, grossly unremarkable  PSYCH: Cooperative

## 2020-06-01 NOTE — ED PROVIDER NOTE - NS ED ROS FT

## 2020-06-01 NOTE — H&P ADULT - HISTORY OF PRESENT ILLNESS
Pt is a 89 YOM with a pmh of Dementia, HTN. Pt presented to the ER due to his niece's inability to take care of patient. Pt was admitted  being treated for UTI / metabolic encephalopathy / dementia, Pt was apparently discharged today home due to family insisting on taking care of patient at home. History obtained from chart as pt is not a reliable historian due to dementia.     Pt is a 89 YOM with a pmh of Dementia, HTN. Pt presented to the ER due to his niece's inability to take care of patient. Pt was admitted  being treated for UTI / metabolic encephalopathy / dementia, Pt was apparently discharged home today due to family insisting on taking care of patient at home although pt was not able to be taken care of at home and pt was sent back to the ER. Pt was seen and examined at bedside, he does not know why he is in the ER and is confused at baseline due to dementia. Pt was being treated for a UTI with cefdinir. Pt's CPK was noted to be elevated at 570 which was noted to be declining from past values.

## 2020-06-01 NOTE — ED PROVIDER NOTE - PROGRESS NOTE DETAILS
Discussed with niece who would like pt admitted for placement. Patient to be admitted to an inpatient floor. Case discussed with and care endorsed to admitting physician

## 2020-06-01 NOTE — H&P ADULT - NSHPLABSRESULTS_GEN_ALL_CORE
12.6   5.00  )-----------( 202      ( 2020 18:31 )             39.4       06-01    138  |  107  |  48<H>  ----------------------------<  96  5.0   |  21  |  2.7<H>    Ca    9.2      2020 18:31  Mg     2.3     06-    TPro  6.7  /  Alb  4.0  /  TBili  0.5  /  DBili  x   /  AST  41  /  ALT  27  /  AlkPhos  235<H>  06-01              Urinalysis Basic - ( 2020 19:27 )    Color: Yellow / Appearance: Clear / S.020 / pH: x  Gluc: x / Ketone: Negative  / Bili: Negative / Urobili: 0.2 mg/dL   Blood: x / Protein: 100 mg/dL / Nitrite: Negative   Leuk Esterase: Trace / RBC: 1-2 /HPF / WBC 26-50 /HPF   Sq Epi: x / Non Sq Epi: Occasional /HPF / Bacteria: Few            Lactate Trend      CARDIAC MARKERS ( 2020 18:31 )  x     / x     / 570 U/L / x     / x            CAPILLARY BLOOD GLUCOSE

## 2020-06-01 NOTE — H&P ADULT - ASSESSMENT
89 YO M who presented to the ER due to the inability of patient to be cared for by family. Will treat Pt with ceftriaxone for UTI pending urine culture. Physical therapy,  consult for placement. HTN - uncontrolled, it is not known if pt took his home meds, titer bp medications, cont home meds, pt is s/p hydralazine 25mg PO, will continue to monitor BP. Elevated CPK, due to mild rhabdomyolysis, light hydration and trend CPK. MADISON on CKD4 - light hydration and monitor renal function, baseline Cr ~2.3.         DVT PPx: heparin

## 2020-06-01 NOTE — H&P ADULT - NSHPPHYSICALEXAM_GEN_ALL_CORE
PHYSICAL EXAM:    CONSTITUTIONAL: Elderly male, saturating at >90% on RA.   ENMT: EOMI, PERRLA, No tonsillar erythema, exudates, or enlargement, neck supple, No JVD  RESPIRATORY: Clear to percussion bilaterally; No rales, rhonchi, wheezing, or rubs  CARDIOVASCULAR: Regular rate and rhythm; No murmurs, rubs, or gallops, negative edema  GASTROINTESTINAL: Soft, Nontender, Nondistended; Bowel sounds present  EXTREMITIES:  2+ Peripheral Pulses, No clubbing, cyanosis  PSYCH: Alert & Oriented X1 to person, not to time and place,   NEURO: follows commands. Alert although confused.   SKIN: No rashes or lesions

## 2020-06-01 NOTE — ED ADULT TRIAGE NOTE - CHIEF COMPLAINT QUOTE
dropped off by niece and brother who state patient was recently discharged from the hospital and today "his legs are swollen and he he is worse" patient is covered with filthy clothes and stated "I don't know why I am here" NAD noted

## 2020-06-01 NOTE — ED PROVIDER NOTE - OBJECTIVE STATEMENT
89 y/o male with PMH of dementia who was sent to ED by niece for admission. PT lives at home with his brother and is cared for by niece. Niece states pt is unable to care for himself and she is no longer able to care for him. No recent fall. Pt has hx of dementia and is poor historian, hx obtained from niece and old records.

## 2020-06-01 NOTE — ED ADULT NURSE REASSESSMENT NOTE - NS ED NURSE REASSESS COMMENT FT1
MD Koch made aware of high blood pressure. Orders for PO hydralizine to be given and patient is to go to cardiac rehab to bed admitted as med surg. Patient in no distress at this time.

## 2020-06-01 NOTE — ED PROVIDER NOTE - CLINICAL SUMMARY MEDICAL DECISION MAKING FREE TEXT BOX
89y male with dementia brought back to ED by niece who is unable to care for him, pt just discharged after admission for same, found to have dehydration/MADISON at that time and family was offered placement but felt they could care for him, pt currently AAO x 1 as he was on last visit, PE as above, labs and studies reviewed, will admit for placement, stable for floor

## 2020-06-02 PROBLEM — F03.90 UNSPECIFIED DEMENTIA WITHOUT BEHAVIORAL DISTURBANCE: Chronic | Status: ACTIVE | Noted: 2020-05-26

## 2020-06-02 LAB
ALBUMIN SERPL ELPH-MCNC: 3.6 G/DL — SIGNIFICANT CHANGE UP (ref 3.5–5.2)
ALP SERPL-CCNC: 216 U/L — HIGH (ref 30–115)
ALT FLD-CCNC: 24 U/L — SIGNIFICANT CHANGE UP (ref 0–41)
ANION GAP SERPL CALC-SCNC: 11 MMOL/L — SIGNIFICANT CHANGE UP (ref 7–14)
AST SERPL-CCNC: 42 U/L — HIGH (ref 0–41)
BILIRUB SERPL-MCNC: 0.4 MG/DL — SIGNIFICANT CHANGE UP (ref 0.2–1.2)
BUN SERPL-MCNC: 42 MG/DL — HIGH (ref 10–20)
CALCIUM SERPL-MCNC: 8.8 MG/DL — SIGNIFICANT CHANGE UP (ref 8.5–10.1)
CHLORIDE SERPL-SCNC: 110 MMOL/L — SIGNIFICANT CHANGE UP (ref 98–110)
CO2 SERPL-SCNC: 19 MMOL/L — SIGNIFICANT CHANGE UP (ref 17–32)
CREAT SERPL-MCNC: 2.5 MG/DL — HIGH (ref 0.7–1.5)
GLUCOSE SERPL-MCNC: 82 MG/DL — SIGNIFICANT CHANGE UP (ref 70–99)
HCT VFR BLD CALC: 38.1 % — LOW (ref 42–52)
HGB BLD-MCNC: 12.5 G/DL — LOW (ref 14–18)
MCHC RBC-ENTMCNC: 29.3 PG — SIGNIFICANT CHANGE UP (ref 27–31)
MCHC RBC-ENTMCNC: 32.8 G/DL — SIGNIFICANT CHANGE UP (ref 32–37)
MCV RBC AUTO: 89.4 FL — SIGNIFICANT CHANGE UP (ref 80–94)
NRBC # BLD: 0 /100 WBCS — SIGNIFICANT CHANGE UP (ref 0–0)
PLATELET # BLD AUTO: 181 K/UL — SIGNIFICANT CHANGE UP (ref 130–400)
POTASSIUM SERPL-MCNC: 4.8 MMOL/L — SIGNIFICANT CHANGE UP (ref 3.5–5)
POTASSIUM SERPL-SCNC: 4.8 MMOL/L — SIGNIFICANT CHANGE UP (ref 3.5–5)
PROT SERPL-MCNC: 5.8 G/DL — LOW (ref 6–8)
RBC # BLD: 4.26 M/UL — LOW (ref 4.7–6.1)
RBC # FLD: 14.5 % — SIGNIFICANT CHANGE UP (ref 11.5–14.5)
SARS-COV-2 RNA SPEC QL NAA+PROBE: SIGNIFICANT CHANGE UP
SODIUM SERPL-SCNC: 140 MMOL/L — SIGNIFICANT CHANGE UP (ref 135–146)
WBC # BLD: 5.06 K/UL — SIGNIFICANT CHANGE UP (ref 4.8–10.8)
WBC # FLD AUTO: 5.06 K/UL — SIGNIFICANT CHANGE UP (ref 4.8–10.8)

## 2020-06-02 PROCEDURE — 99239 HOSP IP/OBS DSCHRG MGMT >30: CPT

## 2020-06-02 RX ORDER — HALOPERIDOL DECANOATE 100 MG/ML
1 INJECTION INTRAMUSCULAR ONCE
Refills: 0 | Status: COMPLETED | OUTPATIENT
Start: 2020-06-02 | End: 2020-06-02

## 2020-06-02 RX ORDER — QUETIAPINE FUMARATE 200 MG/1
25 TABLET, FILM COATED ORAL
Refills: 0 | Status: DISCONTINUED | OUTPATIENT
Start: 2020-06-02 | End: 2020-06-03

## 2020-06-02 RX ORDER — HYDRALAZINE HCL 50 MG
5 TABLET ORAL ONCE
Refills: 0 | Status: COMPLETED | OUTPATIENT
Start: 2020-06-02 | End: 2020-06-02

## 2020-06-02 RX ORDER — HALOPERIDOL DECANOATE 100 MG/ML
1 INJECTION INTRAMUSCULAR ONCE
Refills: 0 | Status: DISCONTINUED | OUTPATIENT
Start: 2020-06-02 | End: 2020-06-02

## 2020-06-02 RX ORDER — CEFPODOXIME PROXETIL 100 MG
100 TABLET ORAL
Refills: 0 | Status: DISCONTINUED | OUTPATIENT
Start: 2020-06-02 | End: 2020-06-03

## 2020-06-02 RX ORDER — AMLODIPINE BESYLATE 2.5 MG/1
5 TABLET ORAL DAILY
Refills: 0 | Status: DISCONTINUED | OUTPATIENT
Start: 2020-06-03 | End: 2020-06-03

## 2020-06-02 RX ORDER — AMLODIPINE BESYLATE 2.5 MG/1
7.5 TABLET ORAL ONCE
Refills: 0 | Status: COMPLETED | OUTPATIENT
Start: 2020-06-02 | End: 2020-06-02

## 2020-06-02 RX ORDER — AMLODIPINE BESYLATE 2.5 MG/1
5 TABLET ORAL ONCE
Refills: 0 | Status: COMPLETED | OUTPATIENT
Start: 2020-06-02 | End: 2020-06-02

## 2020-06-02 RX ORDER — AMLODIPINE BESYLATE 2.5 MG/1
1 TABLET ORAL
Qty: 0 | Refills: 0 | DISCHARGE
Start: 2020-06-02

## 2020-06-02 RX ADMIN — HEPARIN SODIUM 5000 UNIT(S): 5000 INJECTION INTRAVENOUS; SUBCUTANEOUS at 05:47

## 2020-06-02 RX ADMIN — Medication 5 MILLIGRAM(S): at 22:45

## 2020-06-02 RX ADMIN — Medication 100 MILLIGRAM(S): at 18:08

## 2020-06-02 RX ADMIN — SODIUM CHLORIDE 75 MILLILITER(S): 9 INJECTION INTRAMUSCULAR; INTRAVENOUS; SUBCUTANEOUS at 07:18

## 2020-06-02 RX ADMIN — AMLODIPINE BESYLATE 2.5 MILLIGRAM(S): 2.5 TABLET ORAL at 05:47

## 2020-06-02 RX ADMIN — HEPARIN SODIUM 5000 UNIT(S): 5000 INJECTION INTRAVENOUS; SUBCUTANEOUS at 14:21

## 2020-06-02 RX ADMIN — QUETIAPINE FUMARATE 25 MILLIGRAM(S): 200 TABLET, FILM COATED ORAL at 18:08

## 2020-06-02 RX ADMIN — AMLODIPINE BESYLATE 7.5 MILLIGRAM(S): 2.5 TABLET ORAL at 16:16

## 2020-06-02 RX ADMIN — HEPARIN SODIUM 5000 UNIT(S): 5000 INJECTION INTRAVENOUS; SUBCUTANEOUS at 21:48

## 2020-06-02 RX ADMIN — HALOPERIDOL DECANOATE 1 MILLIGRAM(S): 100 INJECTION INTRAMUSCULAR at 00:44

## 2020-06-02 RX ADMIN — HALOPERIDOL DECANOATE 1 MILLIGRAM(S): 100 INJECTION INTRAMUSCULAR at 14:21

## 2020-06-02 RX ADMIN — SODIUM CHLORIDE 75 MILLILITER(S): 9 INJECTION INTRAMUSCULAR; INTRAVENOUS; SUBCUTANEOUS at 05:47

## 2020-06-02 RX ADMIN — Medication 2.5 MILLIGRAM(S): at 18:55

## 2020-06-02 NOTE — DISCHARGE NOTE PROVIDER - CARE PROVIDER_API CALL
Solo Ortiz (DO)  Critical Care Medicine; Neurology  [address] [place] [address]  Phone: [phone]  Fax: [phone]  Follow Up Time: 1 week

## 2020-06-02 NOTE — CHART NOTE - NSCHARTNOTEFT_GEN_A_CORE
Called by RN, pt w/ BP: 198/90s and HR: 101bpm. Pt got norvasc 2.5mg this AM. Will add an additional dose of norvasc 7.5mg today. Will change daily norvasc dose to 5mg. D/w Dr. Crews.

## 2020-06-02 NOTE — DISCHARGE NOTE PROVIDER - HOSPITAL COURSE
Pt is a 89M w/ PMH dementia and HTN recently admitted for metabolic encephalopathy secondary to UTI. At that time pt was treated and discharged home despite recommendations to the family for placement. Pt was brought back to the ED shortly after discharge for placement. Pt is a 89M w/ PMH dementia and HTN recently admitted for metabolic encephalopathy secondary to UTI. At that time pt was treated and discharged home despite recommendations to the family for placement. Pt was brought back to the ED shortly after discharge for placement to SNF.         Patient seen and examined this morning.     Lying in bed.  Awake, confused.    Discussed d/c planning to snf discussed with Lev Peoples in detail        Vital Signs Last 24 Hrs    T(C): 36.1 (02 Jun 2020 06:00), Max: 36.4 (01 Jun 2020 17:53)    T(F): 97 (02 Jun 2020 06:00), Max: 97.6 (01 Jun 2020 17:53)    HR: 63 (02 Jun 2020 06:00) (63 - 91)    BP: 164/71 (02 Jun 2020 06:00) (163/86 - 230/95)    BP(mean): --    RR: 18 (02 Jun 2020 06:00) (18 - 18)    SpO2: 95% (01 Jun 2020 19:59) (95% - 97%)        PHYSICAL EXAM:    GENERAL: NAD, well-groomed, well-developed    HEAD:  Atraumatic, Normocephalic    EYES: EOMI, PERRLA, conjunctiva and sclera clear    NERVOUS SYSTEM:  Awake, confused, moves all extremities     CHEST/LUNG: Clear to percussion bilaterally; No rales, rhonchi, wheezing, or rubs    HEART: Regular rate and rhythm; No murmurs, rubs, or gallops    ABDOMEN: Soft, Nontender, Nondistended; Bowel sounds present    EXTREMITIES:  2+ Peripheral Pulses, No clubbing, cyanosis, or edema    SKIN: No rashes or lesions        d/c to snf when bed available    d/c planning took over 40min    d/c papers done by me Pt is a 89M w/ PMH dementia and HTN recently admitted for metabolic encephalopathy secondary to UTI. At that time pt was treated and discharged home despite recommendations to the family for placement. Pt was brought back to the ED shortly after discharge for placement to SNF.         Patient seen and examined this morning.     Lying in bed.  Awake, confused.    Discussed d/c planning to snf discussed with Lev Peoples in detail        Vital Signs Last 24 Hrs    T(C): 36.7 (03 Jun 2020 06:30), Max: 37.1 (02 Jun 2020 21:47)    T(F): 98 (03 Jun 2020 06:30), Max: 98.7 (02 Jun 2020 21:47)    HR: 74 (03 Jun 2020 06:30) (74 - 101)    BP: 131/61 (03 Jun 2020 06:30) (131/61 - 215/84)    BP(mean): --    RR: 18 (03 Jun 2020 06:30) (18 - 18)    SpO2: --        PHYSICAL EXAM:    GENERAL: NAD, well-groomed, well-developed    HEAD:  Atraumatic, Normocephalic    EYES: EOMI, PERRLA, conjunctiva and sclera clear    NERVOUS SYSTEM:  Awake, confused, moves all extremities     CHEST/LUNG: Clear to percussion bilaterally; No rales, rhonchi, wheezing, or rubs    HEART: Regular rate and rhythm; No murmurs, rubs, or gallops    ABDOMEN: Soft, Nontender, Nondistended; Bowel sounds present    EXTREMITIES:  2+ Peripheral Pulses, No clubbing, cyanosis, or edema    SKIN: No rashes or lesions        d/c to snf when bed available    d/c planning took over 40min    d/c papers done by me

## 2020-06-02 NOTE — PHYSICAL THERAPY INITIAL EVALUATION ADULT - GENERAL OBSERVATIONS, REHAB EVAL
10:00 - 10:23. Chart reviewed. Patient available to be seen for physical therapy, confirmed with nurse. Patient encountered semi-reclined in bed, +IV. Denies pain at rest, agreeable for PT.

## 2020-06-02 NOTE — PHYSICAL THERAPY INITIAL EVALUATION ADULT - LEVEL OF INDEPENDENCE: GAIT, REHAB EVAL
despite maxA by PT; secondary to significantly decreased strength, balance, and postural control, poor command follow/unable to perform

## 2020-06-02 NOTE — PHYSICAL THERAPY INITIAL EVALUATION ADULT - PATIENT/FAMILY/SIGNIFICANT OTHER GOALS STATEMENT, PT EVAL
Patient unable to participate in goal setting at this time secondary to confusion / impaired cognition

## 2020-06-02 NOTE — PHYSICAL THERAPY INITIAL EVALUATION ADULT - LIVES WITH, PROFILE
Patient is poor historian ; as per chart review, appears that patient was residing with niece/other relative

## 2020-06-02 NOTE — PHYSICAL THERAPY INITIAL EVALUATION ADULT - LEVEL OF INDEPENDENCE: SIT/STAND, REHAB EVAL
unable to perform/despite maxA by PT; secondary to significantly decreased strength, balance, and postural control, poor command follow

## 2020-06-02 NOTE — CHART NOTE - NSCHARTNOTEFT_GEN_A_CORE
Called by RN, patient very agitated/pulling at IVs. Will order haldol x1 dose for agitation. D/w Dr. Crews

## 2020-06-02 NOTE — DISCHARGE NOTE PROVIDER - NSDCMRMEDTOKEN_GEN_ALL_CORE_FT
Norvasc 2.5 mg oral tablet: 1 tab(s) orally once a day amLODIPine 5 mg oral tablet: 1 tab(s) orally once a day  QUEtiapine 25 mg oral tablet: 1 tab(s) orally 2 times a day amLODIPine 10 mg oral tablet: 1 tab(s) orally once a day  QUEtiapine 25 mg oral tablet: 1 tab(s) orally 2 times a day

## 2020-06-03 VITALS
DIASTOLIC BLOOD PRESSURE: 72 MMHG | RESPIRATION RATE: 18 BRPM | SYSTOLIC BLOOD PRESSURE: 176 MMHG | HEART RATE: 68 BPM | TEMPERATURE: 99 F

## 2020-06-03 LAB
CULTURE RESULTS: SIGNIFICANT CHANGE UP
SPECIMEN SOURCE: SIGNIFICANT CHANGE UP

## 2020-06-03 PROCEDURE — 99233 SBSQ HOSP IP/OBS HIGH 50: CPT

## 2020-06-03 RX ORDER — AMLODIPINE BESYLATE 2.5 MG/1
5 TABLET ORAL ONCE
Refills: 0 | Status: COMPLETED | OUTPATIENT
Start: 2020-06-03 | End: 2020-06-03

## 2020-06-03 RX ORDER — AMLODIPINE BESYLATE 2.5 MG/1
1 TABLET ORAL
Qty: 30 | Refills: 0
Start: 2020-06-03 | End: 2020-07-02

## 2020-06-03 RX ORDER — QUETIAPINE FUMARATE 200 MG/1
1 TABLET, FILM COATED ORAL
Qty: 0 | Refills: 0 | DISCHARGE
Start: 2020-06-03

## 2020-06-03 RX ORDER — LABETALOL HCL 100 MG
20 TABLET ORAL ONCE
Refills: 0 | Status: COMPLETED | OUTPATIENT
Start: 2020-06-03 | End: 2020-06-03

## 2020-06-03 RX ADMIN — AMLODIPINE BESYLATE 5 MILLIGRAM(S): 2.5 TABLET ORAL at 16:56

## 2020-06-03 RX ADMIN — AMLODIPINE BESYLATE 5 MILLIGRAM(S): 2.5 TABLET ORAL at 05:57

## 2020-06-03 RX ADMIN — Medication 100 MILLIGRAM(S): at 17:00

## 2020-06-03 RX ADMIN — HEPARIN SODIUM 5000 UNIT(S): 5000 INJECTION INTRAVENOUS; SUBCUTANEOUS at 05:57

## 2020-06-03 RX ADMIN — Medication 20 MILLIGRAM(S): at 06:10

## 2020-06-03 RX ADMIN — QUETIAPINE FUMARATE 25 MILLIGRAM(S): 200 TABLET, FILM COATED ORAL at 05:57

## 2020-06-03 RX ADMIN — HEPARIN SODIUM 5000 UNIT(S): 5000 INJECTION INTRAVENOUS; SUBCUTANEOUS at 15:00

## 2020-06-03 RX ADMIN — QUETIAPINE FUMARATE 25 MILLIGRAM(S): 200 TABLET, FILM COATED ORAL at 17:00

## 2020-06-03 RX ADMIN — Medication 100 MILLIGRAM(S): at 05:57

## 2020-06-03 NOTE — CHART NOTE - NSCHARTNOTEFT_GEN_A_CORE
Called in by RN   States patient is hypertensive with BP of 176/72   patient is not in pain, mildly agitated  patient had a similar incident yesterday and received a norvasc 10mg   Today he only received 5mg of Norvasc, therefore we will give a stat dose of Norvasc 5mg  Modified the med rec and discharge papers, he needs Norvasc 10mg QD   Discussed the above with the attending

## 2020-06-03 NOTE — PROGRESS NOTE ADULT - SUBJECTIVE AND OBJECTIVE BOX
[place]  89y  Male      Patient is a 89y old Male who presents with a chief complaint of placement (2020 09:48)      INTERVAL HPI/OVERNIGHT EVENTS:  Patient seen and examined earlier this morning.  Lying in bed. Calm      REVIEW OF SYSTEMS:  unable to determine due to mental status    T(C): 36.7 (20 @ 06:30), Max: 37.1 (20 @ 21:47)  HR: 74 (20 @ 06:30) (74 - 101)  BP: 131/61 (20 @ 06:30) (131/61 - 215/84)  RR: 18 (20 @ 06:30) (18 - 18)  SpO2: --    PHYSICAL EXAM:  GENERAL: NAD, well-groomed, well-developed; elderly male   HEAD:  Atraumatic, Normocephalic  EYES: EOMI, PERRLA, conjunctiva and sclera clear  ENMT: No tonsillar erythema, exudates, or enlargement; Moist mucous membranes, Good dentition, No lesions  NECK: Supple, No JVD, Normal thyroid  NERVOUS SYSTEM:  awake, lethargic, moves all extremities   CHEST/LUNG: Clear to percussion bilaterally; No rales, rhonchi, wheezing, or rubs  HEART: Regular rate and rhythm; No murmurs, rubs, or gallops  ABDOMEN: Soft, Nontender, Nondistended; Bowel sounds present  EXTREMITIES:  2+ Peripheral Pulses, No clubbing, cyanosis, or edema  LYMPH: No lymphadenopathy noted  SKIN: No rashes or lesions    Consultant(s) Notes Reviewed:  [x ] YES  [ ] NO  Care Discussed with Consultants/Other Providers [ x] YES  [ ] NO    LAB:                        12.5   5.06  )-----------( 181      ( 2020 06:40 )             38.1     06-02    140  |  110  |  42<H>  ----------------------------<  82  4.8   |  19  |  2.5<H>    Ca    8.8      2020 06:40  Mg     2.3     06-01    TPro  5.8<L>  /  Alb  3.6  /  TBili  0.4  /  DBili  x   /  AST  42<H>  /  ALT  24  /  AlkPhos  216<H>  02    LIVER FUNCTIONS - ( 2020 06:40 )  Alb: 3.6 g/dL / Pro: 5.8 g/dL / ALK PHOS: 216 U/L / ALT: 24 U/L / AST: 42 U/L / GGT: x           CARDIAC MARKERS ( 2020 18:31 )  x     / x     / 570 U/L / x     / x            Drug Dosing Weight  Height (cm): 170.2 (2020 21:00)  Weight (kg): 63.2 (2020 21:00)  BMI (kg/m2): 21.8 (2020 21:00)  BSA (m2): 1.73 (2020 21:00)        Urinalysis Basic - ( 2020 19:27 )    Color: Yellow / Appearance: Clear / S.020 / pH: x  Gluc: x / Ketone: Negative  / Bili: Negative / Urobili: 0.2 mg/dL   Blood: x / Protein: 100 mg/dL / Nitrite: Negative   Leuk Esterase: Trace / RBC: 1-2 /HPF / WBC 26-50 /HPF   Sq Epi: x / Non Sq Epi: Occasional /HPF / Bacteria: Few        RADIOLOGY & ADDITIONAL TESTS:  Imaging Personally Reviewed:  [x] YES  [ ] NO        MEDS:  amLODIPine   Tablet 5 milliGRAM(s) Oral daily  cefpodoxime 100 milliGRAM(s) Oral two times a day  heparin   Injectable 5000 Unit(s) SubCutaneous every 8 hours  QUEtiapine 25 milliGRAM(s) Oral two times a day

## 2020-06-03 NOTE — PROGRESS NOTE ADULT - ASSESSMENT
Metabolic encephalopathy likely due to UTI/Dementia  -continue IVF and oral abx  -urine culture - contaminant  -pt following  -covid -19 negative     s/p recent fall at the end of May  -repeat ck level downtrending  -encourage oral intake  -PT following  -fall precautions    CKD IV  -at basline ( baseline cr is 2.3 in 2018)    UTI  -continue oral abx    LLE edema   -LE doppler negative for DVT  -follow up echo results        Progress Note Handoff  Pending Consults: PT  Pending Tests: none  Pending Results: none  Family Discussion: discussed d/c to SNF with pt's niece  on 6/2- in agreement with plan of care   Disposition: Home_____/SNF__x____/Other_____/Unknown at this time____    Please call me with any questions at extension [phone]

## 2020-06-03 NOTE — DISCHARGE NOTE NURSING/CASE MANAGEMENT/SOCIAL WORK - PATIENT PORTAL LINK FT
You can access the FollowMyHealth Patient Portal offered by [facility] by registering at the following website: [email or web address]. By joining [employer]’s FollowMyHealth portal, you will also be able to view your health information using other applications (apps) compatible with our system.

## 2020-06-05 DIAGNOSIS — Z75.1 PERSON AWAITING ADMISSION TO ADEQUATE FACILITY ELSEWHERE: ICD-10-CM

## 2020-06-05 DIAGNOSIS — I12.9 HYPERTENSIVE CHRONIC KIDNEY DISEASE WITH STAGE 1 THROUGH STAGE 4 CHRONIC KIDNEY DISEASE, OR UNSPECIFIED CHRONIC KIDNEY DISEASE: ICD-10-CM

## 2020-06-05 DIAGNOSIS — N39.0 URINARY TRACT INFECTION, SITE NOT SPECIFIED: ICD-10-CM

## 2020-06-05 DIAGNOSIS — M62.82 RHABDOMYOLYSIS: ICD-10-CM

## 2020-06-05 DIAGNOSIS — G93.41 METABOLIC ENCEPHALOPATHY: ICD-10-CM

## 2020-06-05 DIAGNOSIS — Z91.81 HISTORY OF FALLING: ICD-10-CM

## 2020-06-05 DIAGNOSIS — R53.1 WEAKNESS: ICD-10-CM

## 2020-06-05 DIAGNOSIS — F03.90 UNSPECIFIED DEMENTIA WITHOUT BEHAVIORAL DISTURBANCE: ICD-10-CM

## 2020-06-05 DIAGNOSIS — E86.0 DEHYDRATION: ICD-10-CM

## 2020-06-05 DIAGNOSIS — N18.4 CHRONIC KIDNEY DISEASE, STAGE 4 (SEVERE): ICD-10-CM

## 2020-06-05 DIAGNOSIS — Z74.2 NEED FOR ASSISTANCE AT HOME AND NO OTHER HOUSEHOLD MEMBER ABLE TO RENDER CARE: ICD-10-CM

## 2020-06-05 DIAGNOSIS — R60.0 LOCALIZED EDEMA: ICD-10-CM

## 2020-06-05 DIAGNOSIS — N17.9 ACUTE KIDNEY FAILURE, UNSPECIFIED: ICD-10-CM

## 2021-11-08 NOTE — H&P ADULT - NSCORESITESY/N_GEN_A_CORE_RD
Arrived ambulatory for prolia injection. Indication and side effects reviewed. Denies recent dental work. Labs and medications verified. Prolia administered in right arm without incidence. Instructed to call prescribing MD for any concerns or questions and instructed on how to schedule future appts.  Pt vu and discharged ambulatory.    
No

## 2023-01-23 NOTE — ED PROVIDER NOTE - CLINICAL SUMMARY MEDICAL DECISION MAKING FREE TEXT BOX
89yo M history of dementia BIBEMS presenting sp fall. Per family/EMS, he was at [place] but wandered off, fell. Unknown mechanism of fall. Pt unsure. Pt currently with no complaints. Per family, pt lives with his brother who cares for him. labs ekg imaging reviewed. pt poorly cared for at him based on his exam. will admit for SW Area H Indication Text: Tumors in this location are included in Area H (eyelids, eyebrows, nose, lips, chin, ear, pre-auricular, post-auricular, temple, genitalia, hands, feet, ankles and areola).  Tissue conservation is critical in these anatomic locations. 89yo M history of dementia BIBEMS presenting sp fall. Per family/EMS, he was at [place] but wandered off, fell. Unknown mechanism of fall. Pt unsure. Pt currently with no complaints. Per family, pt lives with his brother who cares for him. labs ekg imaging reviewed. pt poorly cared for at him based on his exam. labs ekg imaging reviewed. will admit for SW

## 2024-05-04 NOTE — ED PROVIDER NOTE - MDM PATIENT STATEMENT FOR ADDL TREATMENT
Strong peripheral pulses
Patient with one or more new problems requiring additional work-up/treatment.